# Patient Record
Sex: FEMALE | Race: WHITE | NOT HISPANIC OR LATINO | Employment: FULL TIME | URBAN - METROPOLITAN AREA
[De-identification: names, ages, dates, MRNs, and addresses within clinical notes are randomized per-mention and may not be internally consistent; named-entity substitution may affect disease eponyms.]

---

## 2017-05-08 ENCOUNTER — ALLSCRIPTS OFFICE VISIT (OUTPATIENT)
Dept: OTHER | Facility: OTHER | Age: 37
End: 2017-05-08

## 2017-05-09 ENCOUNTER — GENERIC CONVERSION - ENCOUNTER (OUTPATIENT)
Dept: OTHER | Facility: OTHER | Age: 37
End: 2017-05-09

## 2017-05-10 ENCOUNTER — TRANSCRIBE ORDERS (OUTPATIENT)
Dept: ADMINISTRATIVE | Facility: HOSPITAL | Age: 37
End: 2017-05-10

## 2017-05-10 DIAGNOSIS — N63.0 BENIGN BREAST LUMPS: Primary | ICD-10-CM

## 2017-05-11 ENCOUNTER — HOSPITAL ENCOUNTER (OUTPATIENT)
Dept: ULTRASOUND IMAGING | Facility: CLINIC | Age: 37
Discharge: HOME | End: 2017-05-11
Payer: COMMERCIAL

## 2017-05-11 DIAGNOSIS — N63.0 BENIGN BREAST LUMPS: ICD-10-CM

## 2017-05-11 DIAGNOSIS — N60.19 DIFFUSE CYSTIC MASTOPATHY OF BREAST: ICD-10-CM

## 2017-05-11 PROCEDURE — 76642 ULTRASOUND BREAST LIMITED: CPT

## 2017-05-11 RX ORDER — METHYLPREDNISOLONE 4 MG/1
TABLET ORAL
COMMUNITY
Start: 2015-09-15 | End: 2020-12-10

## 2017-05-11 RX ORDER — FLUTICASONE PROPIONATE 50 MCG
2 SPRAY, SUSPENSION (ML) NASAL
COMMUNITY
Start: 2015-09-15 | End: 2020-12-10

## 2017-05-11 RX ORDER — OMEPRAZOLE 40 MG/1
40 CAPSULE, DELAYED RELEASE ORAL
COMMUNITY
Start: 2016-05-17 | End: 2020-12-10

## 2017-05-11 RX ORDER — MAGNESIUM HYDROXIDE 1200 MG/15ML
LIQUID ORAL
COMMUNITY
Start: 2014-11-07 | End: 2020-12-10

## 2017-05-11 RX ORDER — AZITHROMYCIN 250 MG/1
250 TABLET, FILM COATED ORAL
COMMUNITY
Start: 2017-03-21 | End: 2020-12-10

## 2017-05-11 RX ORDER — ESCITALOPRAM OXALATE 10 MG/1
20 TABLET ORAL
COMMUNITY
Start: 2016-04-23 | End: 2020-12-10

## 2017-05-11 RX ORDER — METHYLPREDNISOLONE 4 MG/1
TABLET ORAL
COMMUNITY
Start: 2017-03-21 | End: 2020-12-10

## 2017-05-15 ENCOUNTER — HOSPITAL ENCOUNTER (OUTPATIENT)
Dept: ULTRASOUND IMAGING | Facility: CLINIC | Age: 37
Discharge: HOME | End: 2017-05-15
Payer: COMMERCIAL

## 2017-05-15 ENCOUNTER — HOSPITAL ENCOUNTER (OUTPATIENT)
Dept: ULTRASOUND IMAGING | Facility: CLINIC | Age: 37
Discharge: HOME | End: 2017-05-15
Admitting: RADIOLOGY
Payer: COMMERCIAL

## 2017-05-15 VITALS — SYSTOLIC BLOOD PRESSURE: 130 MMHG | DIASTOLIC BLOOD PRESSURE: 80 MMHG | HEART RATE: 72 BPM

## 2017-05-15 DIAGNOSIS — N60.19 DIFFUSE CYSTIC MASTOPATHY OF BREAST: ICD-10-CM

## 2017-05-15 DIAGNOSIS — R92.8 ABNORMAL FINDINGS ON DIAGNOSTIC IMAGING OF BREAST: ICD-10-CM

## 2017-05-15 DIAGNOSIS — N63.0 LUMP OR MASS IN BREAST: ICD-10-CM

## 2017-05-15 PROCEDURE — 88305 TISSUE EXAM BY PATHOLOGIST: CPT | Performed by: NURSE PRACTITIONER

## 2017-05-15 PROCEDURE — 19084 BX BREAST ADD LESION US IMAG: CPT

## 2017-05-15 PROCEDURE — 19083 BX BREAST 1ST LESION US IMAG: CPT

## 2017-05-15 RX ORDER — LIDOCAINE HYDROCHLORIDE 10 MG/ML
4 INJECTION, SOLUTION INFILTRATION; PERINEURAL ONCE
Status: COMPLETED | OUTPATIENT
Start: 2017-05-15 | End: 2017-05-15

## 2017-05-15 RX ORDER — SODIUM BICARBONATE 42 MG/ML
1 INJECTION, SOLUTION INTRAVENOUS ONCE
Status: COMPLETED | OUTPATIENT
Start: 2017-05-15 | End: 2017-05-15

## 2017-05-15 RX ADMIN — SODIUM BICARBONATE 0.5 MEQ: 42 SOLUTION INTRAVENOUS at 14:35

## 2017-05-15 RX ADMIN — SODIUM BICARBONATE 0.5 MEQ: 42 SOLUTION INTRAVENOUS at 14:44

## 2017-05-15 RX ADMIN — LIDOCAINE HYDROCHLORIDE 4 ML: 10 INJECTION, SOLUTION INFILTRATION; PERINEURAL at 14:44

## 2017-05-15 RX ADMIN — LIDOCAINE HYDROCHLORIDE 4 ML: 10 INJECTION, SOLUTION INFILTRATION; PERINEURAL at 14:35

## 2017-05-16 LAB
ADEQUACY: (HISTORICAL): NORMAL
CLINICIAN PROVIDIED ICD 9 OR 10 (HISTORICAL): NORMAL
COMMENT (HISTORICAL): NORMAL
DIAGNOSIS (HISTORICAL): NORMAL
HPV HIGH RISK (HISTORICAL): NEGATIVE
NOTE: (HISTORICAL): NORMAL
PERFORMED BY (HISTORICAL): NORMAL
TEST INFORMATION (HISTORICAL): NORMAL

## 2017-06-19 ENCOUNTER — ALLSCRIPTS OFFICE VISIT (OUTPATIENT)
Dept: OTHER | Facility: OTHER | Age: 37
End: 2017-06-19

## 2017-06-19 ENCOUNTER — TRANSCRIBE ORDERS (OUTPATIENT)
Dept: ADMINISTRATIVE | Facility: HOSPITAL | Age: 37
End: 2017-06-19

## 2017-06-19 DIAGNOSIS — D24.1 BENIGN NEOPLASM OF RIGHT BREAST: Primary | ICD-10-CM

## 2017-11-16 ENCOUNTER — HOSPITAL ENCOUNTER (OUTPATIENT)
Dept: MAMMOGRAPHY | Facility: CLINIC | Age: 37
Discharge: HOME | End: 2017-11-16
Payer: COMMERCIAL

## 2017-11-16 ENCOUNTER — HOSPITAL ENCOUNTER (OUTPATIENT)
Dept: ULTRASOUND IMAGING | Facility: CLINIC | Age: 37
Discharge: HOME | End: 2017-11-16
Payer: COMMERCIAL

## 2017-11-16 DIAGNOSIS — D24.1 BENIGN NEOPLASM OF RIGHT BREAST: ICD-10-CM

## 2017-11-16 PROCEDURE — G0204 DX MAMMO INCL CAD BI: HCPCS

## 2017-11-16 PROCEDURE — 76642 ULTRASOUND BREAST LIMITED: CPT

## 2017-11-16 PROCEDURE — G0279 TOMOSYNTHESIS, MAMMO: HCPCS

## 2017-11-27 ENCOUNTER — TRANSCRIBE ORDERS (OUTPATIENT)
Dept: ADMINISTRATIVE | Facility: HOSPITAL | Age: 37
End: 2017-11-27

## 2017-11-27 ENCOUNTER — ALLSCRIPTS OFFICE VISIT (OUTPATIENT)
Dept: OTHER | Facility: OTHER | Age: 37
End: 2017-11-27

## 2017-11-27 DIAGNOSIS — D24.1 BENIGN NEOPLASM OF RIGHT BREAST: Primary | ICD-10-CM

## 2017-11-28 NOTE — PROGRESS NOTES
Assessment    1  Fibroadenoma, right (217) (D24 1)   2  Fibrocystic breast (610 1) (N60 19)    Plan  Fibroadenoma, right    · Follow-up visit in 1 year Evaluation and Treatment  Follow-up  Status: Complete  Done:2017   Ordered;Fibroadenoma, right; Ordered By: Johnny Colindres Performed:  Due: 03REE5826; Last Updated By: Alexandria Smart; 2017 12:26:07 PM   · *US BREAST RIGHT LIMITED (DIAGNOSTIC); Status:Active; Requested MME:[id number];    Perform:[facility]; CZY:[id number]; Last Updated Lubertha Sep; 2017 12:26:07 PM;Ordered;right; Lisbeth Moreno; Discussion/Summary  30 yo female with two complex fibroadenoma of the right breast  Her exam and imaging are stable  We discussed continued surveillance given the stability  She is also a high risk patient for surgery given her airway issues  I will make arrangements for a right breast ultrasound and exam for one year  I advised her to return sooner should the need arise  Chief Complaint  Chief Complaint Free Text Note Form: Pt is here for her 6 month breast follow-up  new complaints at this time  [email or web address] loren 3d dx mammo/right us (B2)  Ashley Spain  History of Present Illness  Diagnosis and Staging: complex fibroadenoma right breast   Treatment History: 5/15/17 right core x 2      Review of Systems  Complete Female ROS SurgOnc:  Constitutional: weight fluctuates  Eyes: eyesight problems  ENT: difficulty swallowing, but-- no tinnitus-- and-- no new masses in head, oral cavity, or neck  Cardiovascular: No complaints of chest pain, no palpitations, no ankle edema  Respiratory: shortness of breath,-- cough,-- orthopnea-- and-- shortness of breath during exertion  Gastrointestinal: No complaints of jaundice, no bloody stools, no pale stools  Genitourinary: irregular menses  Musculoskeletal: No complaints of weakness, paralysis, joint stiffness or arthralgias,    Integumentary: No complaints of rash, no new lesions  Neurological: headache  Hematologic/Lymphatic: a tendency for easy bruising  Active Problems    1  Encounter for routine gynecological examination with Papanicolaou smear of cervix (V72 31,V76 2) (Z01 419)   2  Fibroadenoma, right (217) (D24 1)   3  Fibrocystic breast (610 1) (N60 19)   4  Skin rash (782 1) (R21)    Past Medical History  1  History of Anxiety (300 00) (F41 9)   2  History of Disorder of airway (519 9) (J98 9)   3  History of cerebral hemorrhage (V12 59) (Z86 79)   4  History of depression (V11 8) (Z86 59)   5  History of gastroesophageal reflux (GERD) (V12 79) (Z87 19)   6  Denied: History of pregnancy   7  History of sinus problem   8  History of sleep apnea (V13 89) (Z86 69)   9  History of Menarche (V21 8)    Surgical History  1  History of Biopsy Breast Percutaneous Needle Core   · 05/15/17 right X two sites   2  History of Tracheostomy   · From premature birth,  Multiple trachea surgeries 2002, 2007, 2013  Surgical History Reviewed: The surgical history was reviewed and updated today  Family History  Mother    1  Family history of hypertension (V17 49) (Z82 49)   2  Family history of type 2 diabetes mellitus (V18 0) (Z83 3)  Father    3  Family history of hypertension (V17 49) (Z82 49)   4  Family history of type 2 diabetes mellitus (V18 0) (Z83 3)  Maternal Grandmother    5  Family history of malignant neoplasm of thyroid (V16 8) (Z80 8)   6  Family history of malignant neoplasm of urinary bladder (V16 52) (Z80 52)  Paternal Grandfather    7  Family history of malignant neoplasm of prostate (V16 42) (Z80 42)   8  Family history of malignant neoplasm of thyroid (V16 8) (Z80 8)  Paternal Cousin    5  Family history of malignant neoplasm of brain (V16 8) (Z80 8)  Family History Reviewed: The family history was reviewed and updated today         Social History     · Alcohol use (V49 89) (Z78 9)   · Never a smoker   · Occasional caffeine consumption  Social History Reviewed: The social history was reviewed and updated today  The social history was reviewed and is unchanged  Current Meds   1  Claritin 10 MG Oral Capsule; Therapy: (Recorded:19Jun2017) to Recorded   2  Flonase 50 MCG/ACT SUSP; Therapy: (Recorded:19Jun2017) to Recorded   3  Lexapro 10 MG Oral Tablet; Therapy: (Recorded:27Nov2017) to Recorded   4  Nystatin 850785 UNIT/GM External Powder; APPLY 2 times daily as needed for yeast infection rash; Therapy: 81JLY2425 to (Last AW:42BTP3849)  Requested for: 41FDR9608 Ordered   5  Omeprazole 40 MG Oral Capsule Delayed Release; Therapy: (Recorded:19Jun2017) to Recorded   6  One Daily Womens Oral Tablet; Therapy: (Recorded:19Jun2017) to Recorded   7  Qvar 40 MCG/ACT Inhalation Aerosol Solution; Therapy: (Recorded:19Jun2017) to Recorded   8  Wellbutrin  MG Oral Tablet Extended Release 24 Hour; Therapy: 75GXE9729 to Recorded  Medication List Reviewed: The medication list was reviewed and updated today  Allergies  1  Cephalosporins   2  Clindamycin Phosphate SOLN   3  Penicillins   4  Vitamin E CAPS  5  Dairy    Vitals  Vital Signs    Recorded: 42UHU5577 11:57AM   Temperature 98 4 F   Heart Rate 127   Respiration 16   Systolic 467   Diastolic 82   Height 5 ft 1 5 in   Weight 169 lb    BMI Calculated 31 42   BSA Calculated 1 77   O2 Saturation 98       Physical Exam   Constitutional: General appearance: Abnormal   chronically ill  Neuro: Orientation to person, place and time: Normal  -- Mood and affect: Normal    Lymphatic: no evidence of cervical adenopathy bilaterally  -- no evidence of axillary adenopathy bilaterally  Skin: Examination of Breast: Abnormal   Breast: Examination of both breasts revealed fibrocystic changes  Examination of the right breast revealed no erythema,-- no swelling-- and-- no tenderness  Examination of the left breast revealed no erythema,-- no swelling-- and-- no tenderness   Nipples appeared normal No discharge was noted from the nipples  No breast masses were palpable  Axillary nodes: no enlarged nodes  Results/Data  Diagnostic Studies Reviewed Surg Onc:  X-ray Review 11/16/17 bilateral 3d diagnostic mammogram and right breast ultrasound with stable findings  Future Appointments    Date/Time Provider Specialty Site   11/27/2018 11:30 AM BRINA Garrison  Surgical Oncology [facility] [place]     End of Encounter Meds    1  Wellbutrin  MG Oral Tablet Extended Release 24 Hour (BuPROPion HCl ER (XL)); Therapy: 22XJK5528 to Recorded    2  Lexapro 10 MG Oral Tablet (Escitalopram Oxalate); Therapy: (Recorded:27Nov2017) to Recorded    3  One Daily Womens Oral Tablet; Therapy: (Recorded:19Jun2017) to Recorded    4  Qvar 40 MCG/ACT Inhalation Aerosol Solution; Therapy: (Recorded:19Jun2017) to Recorded    5  Omeprazole 40 MG Oral Capsule Delayed Release; Therapy: (Recorded:19Jun2017) to Recorded    6  Claritin 10 MG Oral Capsule; Therapy: (Recorded:19Jun2017) to Recorded   7  Flonase 50 MCG/ACT SUSP (Fluticasone Propionate); Therapy: (Recorded:19Jun2017) to Recorded    8  Nystatin 027665 UNIT/GM External Powder; APPLY 2 times daily as needed for yeast infection rash;  Therapy: 97NCJ2129 to (Last XH:85XKX5419)  Requested for: 31AXP1586 Ordered    Signatures   Electronically signed by : BRINA Lopez ; Nov 27 2017  1:19PM EST                       (Author)

## 2018-01-12 VITALS
HEIGHT: 62 IN | DIASTOLIC BLOOD PRESSURE: 82 MMHG | RESPIRATION RATE: 16 BRPM | HEART RATE: 127 BPM | BODY MASS INDEX: 31.1 KG/M2 | WEIGHT: 169 LBS | SYSTOLIC BLOOD PRESSURE: 124 MMHG | TEMPERATURE: 98.4 F | OXYGEN SATURATION: 98 %

## 2018-01-13 VITALS
DIASTOLIC BLOOD PRESSURE: 78 MMHG | SYSTOLIC BLOOD PRESSURE: 140 MMHG | BODY MASS INDEX: 29.1 KG/M2 | HEIGHT: 62 IN | WEIGHT: 158.13 LBS

## 2018-01-14 VITALS
TEMPERATURE: 100.3 F | WEIGHT: 161.13 LBS | RESPIRATION RATE: 17 BRPM | SYSTOLIC BLOOD PRESSURE: 158 MMHG | BODY MASS INDEX: 29.65 KG/M2 | OXYGEN SATURATION: 97 % | HEART RATE: 149 BPM | HEIGHT: 62 IN | DIASTOLIC BLOOD PRESSURE: 94 MMHG

## 2018-11-19 ENCOUNTER — HOSPITAL ENCOUNTER (OUTPATIENT)
Dept: ULTRASOUND IMAGING | Facility: CLINIC | Age: 38
Discharge: HOME | End: 2018-11-19
Payer: COMMERCIAL

## 2018-11-19 VITALS — WEIGHT: 186 LBS | BODY MASS INDEX: 34.23 KG/M2 | HEIGHT: 62 IN

## 2018-11-19 DIAGNOSIS — D24.1 BENIGN NEOPLASM OF RIGHT BREAST: ICD-10-CM

## 2018-11-19 PROCEDURE — 76642 ULTRASOUND BREAST LIMITED: CPT

## 2018-12-03 ENCOUNTER — OFFICE VISIT (OUTPATIENT)
Dept: SURGICAL ONCOLOGY | Facility: CLINIC | Age: 38
End: 2018-12-03
Payer: COMMERCIAL

## 2018-12-03 VITALS
TEMPERATURE: 97.7 F | SYSTOLIC BLOOD PRESSURE: 140 MMHG | HEIGHT: 61 IN | BODY MASS INDEX: 34.78 KG/M2 | HEART RATE: 75 BPM | DIASTOLIC BLOOD PRESSURE: 80 MMHG | WEIGHT: 184.2 LBS | RESPIRATION RATE: 14 BRPM

## 2018-12-03 DIAGNOSIS — D24.1 FIBROADENOMA OF RIGHT BREAST: Primary | ICD-10-CM

## 2018-12-03 PROCEDURE — 99213 OFFICE O/P EST LOW 20 MIN: CPT | Performed by: SURGERY

## 2018-12-03 RX ORDER — LORATADINE 10 MG/1
CAPSULE, LIQUID FILLED ORAL
COMMUNITY
End: 2020-12-10

## 2018-12-03 RX ORDER — NYSTATIN 100000 [USP'U]/G
POWDER TOPICAL
COMMUNITY
Start: 2017-05-08 | End: 2020-12-10

## 2018-12-03 RX ORDER — BUPROPION HYDROCHLORIDE 150 MG/1
150 TABLET, EXTENDED RELEASE ORAL
COMMUNITY
End: 2020-12-10

## 2018-12-03 NOTE — PROGRESS NOTES
Surgical Oncology Follow Up       [facility] SURGICAL ONCOLOGY [place]  [address] [facility] [address]    Rupal Eckert  1980  [id number]  [facility] SURGICAL ONCOLOGY [place]  [address] [facility] [address]    Chief Complaint   Patient presents with    Follow-up     1 year        Assessment/Plan   Diagnoses and all orders for this visit:    Fibroadenoma of right breast  -     US breast right limited (diagnostic); Future    Other orders  -     buPROPion (WELLBUTRIN SR) 150 mg 12 hr tablet; Take 150 mg by mouth  -     Loratadine (CLARITIN) 10 MG CAPS; Take by mouth  -     nystatin (MYCOSTATIN) powder; Apply topically        Advance Care Planning/Advance Directives:  Did not discuss  with the patient  Oncology History:     No history exists  History of Present Illness: follow up   -Interval History: none    Review of Systems:  Review of Systems   Constitutional: Negative  Negative for appetite change and fever  Eyes: Negative  Respiratory: Positive for shortness of breath, wheezing and stridor  Cardiovascular: Negative  Gastrointestinal: Negative  Endocrine: Negative  Genitourinary: Negative  Musculoskeletal: Negative  Negative for arthralgias and myalgias  Skin: Negative  Allergic/Immunologic: Negative  Neurological: Negative  Hematological: Negative  Negative for adenopathy  Does not bruise/bleed easily  Psychiatric/Behavioral: Negative          Patient Active Problem List   Diagnosis    Fibroadenoma of right breast     Past Medical History:   Diagnosis Date    Anxiety     Cerebral hemorrhage ([address] )     Depression     Disorder of airway     Fibroadenoma of right breast     GERD (gastroesophageal reflux disease)     Sinus problem     Skin rash     Sleep apnea      Past Surgical History:   Procedure Laterality Date    BREAST BIOPSY Right     2 sites    [address] GUIDANCE BREAST BIOPSY RIGHT EACH ADDITIONAL Right 5/15/2017    US GUIDED BREAST BIOPSY RIGHT COMPLETE Right 5/15/2017     Family History   Problem Relation Age of Onset    Thyroid cancer Maternal Grandmother         age dx unk    Cancer Maternal Grandmother         bladder age dx unk    Prostate cancer Paternal Grandfather         age dx unk    Thyroid cancer Paternal Grandfather         age dx unk    Brain cancer Cousin         age dx unk     Social History     Social History    Marital status: Single     Spouse name: N/A    Number of children: N/A    Years of education: N/A     Occupational History    Not on file       Social History Main Topics    Smoking status: Never Smoker    Smokeless tobacco: Never Used    Alcohol use Yes    Drug use: Unknown    Sexual activity: Not on file     Other Topics Concern    Not on file     Social History Narrative    No narrative on file       Current Outpatient Prescriptions:     azithromycin (ZITHROMAX) 250 mg tablet, Take 250 mg by mouth, Disp: , Rfl:     beclomethasone (QVAR) 40 MCG/ACT inhaler, 2 puffs daily, Disp: , Rfl:     escitalopram (LEXAPRO) 10 mg tablet, Take 20 mg by mouth, Disp: , Rfl:     fluticasone (FLONASE) 50 mcg/act nasal spray, 2 sprays, Disp: , Rfl:     methylprednisolone (MEDROL) 4 mg tablet, Take 6 tabs day 1,5 tabs day 2,4 tabs day 3,3 tabs day 4,2 tabs day 5,1 tab day 6, Disp: , Rfl:     Methylprednisolone 4 MG TBPK, Take as directed, Disp: , Rfl:     MULTIPLE VITAMINS PO, Take by mouth, Disp: , Rfl:     nystatin (MYCOSTATIN) powder, Apply topically, Disp: , Rfl:     omeprazole (PriLOSEC) 40 MG capsule, Take 40 mg by mouth, Disp: , Rfl:     sodium chloride 0 9 % irrigation, Irrigate with as directed, Disp: , Rfl:     buPROPion (WELLBUTRIN SR) 150 mg 12 hr tablet, Take 150 mg by mouth, Disp: , Rfl:     Loratadine (CLARITIN) 10 MG CAPS, Take by mouth, Disp: , Rfl:   Allergies   Allergen Reactions    Cephalosporins     Clindamycin     Dairy Aid [Lactase]     Penicillins     Vitamin E Hives       The following portions of the patient's history were reviewed and updated as appropriate: allergies, current medications, past family history, past medical history, past social history, past surgical history and problem list         Vitals:    12/03/18 1431   BP: 140/80   Pulse: 75   Resp: 14   Temp: 97 7 °F (36 5 °C)       Physical Exam   Constitutional: She is oriented to person, place, and time  She appears distressed (respiratory-baseline)  Pulmonary/Chest: Right breast exhibits mass (2 adjacent well circumscribed) and skin change (fungal rash)  Right breast exhibits no inverted nipple, no nipple discharge and no tenderness  Left breast exhibits skin change (fungal rash)  Left breast exhibits no inverted nipple, no mass, no nipple discharge and no tenderness  Lymphadenopathy:        Right axillary: No pectoral and no lateral adenopathy present  Left axillary: No pectoral and no lateral adenopathy present  Right: No supraclavicular adenopathy present  Left: No supraclavicular adenopathy present  Neurological: She is alert and oriented to person, place, and time  Psychiatric: She has a normal mood and affect  Results:      Imaging  11/19/2018 right breast ultrasound is benign for two stable masses in the right breast nine o'clock axis measuring nine in 7 mm, recommendation was for a screening mammogram in one year    I reviewed the above  imaging data  Discussion/Summary:  74-year-old female who has two known breast masses that are biopsy proven fibroadenomata  These are stable on examination today  Her recent ultrasound is also stable  There was a notation made about doing a mammogram in one year  She is 30 and has no family history of breast cancer  I will therefore order another ultrasound of the right breast in one year and see her again next year    At that point if there are no concerns, the lesions would have remained stable for over two years  I will likely have her resume routine care at that time

## 2019-11-19 ENCOUNTER — HOSPITAL ENCOUNTER (OUTPATIENT)
Dept: ULTRASOUND IMAGING | Facility: CLINIC | Age: 39
Discharge: HOME | End: 2019-11-19
Payer: COMMERCIAL

## 2019-11-19 ENCOUNTER — HOSPITAL ENCOUNTER (OUTPATIENT)
Dept: MAMMOGRAPHY | Facility: CLINIC | Age: 39
Discharge: HOME | End: 2019-11-19
Payer: COMMERCIAL

## 2019-11-19 VITALS — WEIGHT: 184 LBS | HEIGHT: 61 IN | BODY MASS INDEX: 34.74 KG/M2

## 2019-11-19 DIAGNOSIS — Z12.31 OTHER SCREENING MAMMOGRAM: ICD-10-CM

## 2019-11-19 DIAGNOSIS — D24.1 FIBROADENOMA OF RIGHT BREAST: ICD-10-CM

## 2019-11-19 PROCEDURE — 76642 ULTRASOUND BREAST LIMITED: CPT

## 2019-11-19 PROCEDURE — 77067 SCR MAMMO BI INCL CAD: CPT

## 2019-11-19 PROCEDURE — 77063 BREAST TOMOSYNTHESIS BI: CPT

## 2019-12-03 ENCOUNTER — OFFICE VISIT (OUTPATIENT)
Dept: SURGICAL ONCOLOGY | Facility: CLINIC | Age: 39
End: 2019-12-03
Payer: COMMERCIAL

## 2019-12-03 VITALS
BODY MASS INDEX: 33.19 KG/M2 | TEMPERATURE: 97.6 F | DIASTOLIC BLOOD PRESSURE: 80 MMHG | HEIGHT: 61 IN | SYSTOLIC BLOOD PRESSURE: 110 MMHG | RESPIRATION RATE: 16 BRPM | WEIGHT: 175.8 LBS | HEART RATE: 82 BPM

## 2019-12-03 DIAGNOSIS — N60.02 BREAST CYST, LEFT: ICD-10-CM

## 2019-12-03 DIAGNOSIS — D24.1 FIBROADENOMA OF RIGHT BREAST: Primary | ICD-10-CM

## 2019-12-03 PROCEDURE — 99213 OFFICE O/P EST LOW 20 MIN: CPT | Performed by: SURGERY

## 2019-12-03 NOTE — PROGRESS NOTES
Surgical Oncology Follow Up       [address] SUR[facility] [address]    Steve Hull  1980  [id number]  [address]  [facility] SUR[facility]    Chief Complaint   Patient presents with    Follow-up       Assessment/Plan   Diagnoses and all orders for this visit:    Fibroadenoma of right breast    Breast cyst, left        Advance Care Planning/Advance Directives:  Did not discuss  with the patient  Oncology History:     No history exists  History of Present Illness: follow up visit secondary to fibroadenoma  -Interval History:recent breast imaging    Review of Systems:  Review of Systems   Constitutional: Negative  Negative for appetite change and fever  Eyes: Negative  Respiratory: Positive for shortness of breath  Cardiovascular: Negative  Gastrointestinal: Negative  Endocrine: Negative  Genitourinary: Negative  Musculoskeletal: Negative  Negative for arthralgias and myalgias  Skin: Negative  Allergic/Immunologic: Negative  Neurological: Negative  Hematological: Negative  Negative for adenopathy  Does not bruise/bleed easily  Psychiatric/Behavioral: Negative          Patient Active Problem List   Diagnosis    Fibroadenoma of right breast    Breast cyst, left     Past Medical History:   Diagnosis Date    Anxiety     Cerebral hemorrhage (HCC)     Depression     Disorder of airway     Fibroadenoma of right breast     GERD (gastroesophageal reflux disease)     Sinus problem     Skin rash     Sleep apnea      Past Surgical History:   Procedure Laterality Date    BREAST BIOPSY Right 05/15/2017    2 sites-U/S BX    TRACHEOSTOMY      US GUIDANCE BREAST BIOPSY RIGHT EACH ADDITIONAL Right 5/15/2017    US GUIDED BREAST BIOPSY RIGHT COMPLETE Right 5/15/2017     Family History   Problem Relation Age of Onset    Thyroid cancer Maternal Grandmother         age dx unk    Cancer Maternal Grandmother         bladder age dx unk    Prostate cancer Paternal Grandfather         age dx unk    Thyroid cancer Paternal Grandfather         age dx unk    Brain cancer Cousin         age dx unk    No Known Problems Mother     No Known Problems Father     No Known Problems Sister     No Known Problems Maternal Grandfather     No Known Problems Paternal Grandmother     No Known Problems Sister     No Known Problems Brother     No Known Problems Brother     No Known Problems Paternal Aunt     No Known Problems Other     Breast cancer Cousin      Social History     Socioeconomic History    Marital status: Single     Spouse name: Not on file    Number of children: Not on file    Years of education: Not on file    Highest education level: Not on file   Occupational History    Not on file   Social Needs    Financial resource strain: Not on file    Food insecurity:     Worry: Not on file     Inability: Not on file    Transportation needs:     Medical: Not on file     Non-medical: Not on file   Tobacco Use    Smoking status: Never Smoker    Smokeless tobacco: Never Used   Substance and Sexual Activity    Alcohol use:  Yes    Drug use: Not on file    Sexual activity: Not on file   Lifestyle    Physical activity:     Days per week: Not on file     Minutes per session: Not on file    Stress: Not on file   Relationships    Social connections:     Talks on phone: Not on file     Gets together: Not on file     Attends [employer] service: Not on file     Active member of club or organization: Not on file     Attends meetings of clubs or organizations: Not on file     Relationship status: Not on file    Intimate partner violence:     Fear of current or ex partner: Not on file     Emotionally abused: Not on file     Physically abused: Not on file     Forced sexual activity: Not on file   Other Topics Concern    Not on file   Social History Narrative    Not on file       Current Outpatient Medications:     azithromycin (ZITHROMAX) 250 mg tablet, Take 250 mg by mouth, Disp: , Rfl:     beclomethasone (QVAR) 40 MCG/ACT inhaler, 2 puffs daily, Disp: , Rfl:     fluticasone (FLONASE) 50 mcg/act nasal spray, 2 sprays, Disp: , Rfl:     Loratadine (CLARITIN) 10 MG CAPS, Take by mouth, Disp: , Rfl:     methylprednisolone (MEDROL) 4 mg tablet, Take 6 tabs day 1,5 tabs day 2,4 tabs day 3,3 tabs day 4,2 tabs day 5,1 tab day 6, Disp: , Rfl:     Methylprednisolone 4 MG TBPK, Take as directed, Disp: , Rfl:     MULTIPLE VITAMINS PO, Take by mouth, Disp: , Rfl:     nystatin (MYCOSTATIN) powder, Apply topically, Disp: , Rfl:     omeprazole (PriLOSEC) 40 MG capsule, Take 40 mg by mouth, Disp: , Rfl:     buPROPion (WELLBUTRIN SR) 150 mg 12 hr tablet, Take 150 mg by mouth, Disp: , Rfl:     escitalopram (LEXAPRO) 10 mg tablet, Take 20 mg by mouth, Disp: , Rfl:     sodium chloride 0 9 % irrigation, Irrigate with as directed, Disp: , Rfl:   Allergies   Allergen Reactions    Cephalosporins Hives and Fever    Clindamycin Hives and Fever    Dairy Aid [Lactase] Sneezing    Penicillins Hives and Fever    Vitamin E Hives       The following portions of the patient's history were reviewed and updated as appropriate: allergies, current medications, past family history, past medical history, past social history, past surgical history and problem list         Vitals:    12/03/19 1053   BP: 110/80   Pulse: 82   Resp: 16   Temp: 97 6 °F (36 4 °C)       Physical Exam   Constitutional: She is oriented to person, place, and time  She appears well-developed and well-nourished  HENT:   Head: Normocephalic and atraumatic  Pulmonary/Chest: Right breast exhibits no inverted nipple, no mass, no nipple discharge, no skin change and no tenderness  Left breast exhibits no inverted nipple, no mass, no nipple discharge, no skin change and no tenderness     Lymphadenopathy: Right axillary: No pectoral and no lateral adenopathy present  Left axillary: No pectoral and no lateral adenopathy present  Right: No supraclavicular adenopathy present  Left: No supraclavicular adenopathy present  Neurological: She is alert and oriented to person, place, and time  Psychiatric: She has a normal mood and affect  Results:  Labs:      Imaging  11/19/2019 bilateral 3D screening mammogram and bilateral breast ultrasound her mammogram shows dense breast tissue, there is an oval mass in the lower inner left breast, biopsy clips on the right breast, bilateral ultrasound was also performed showing a simple cyst on the left measuring 6 mm and two hypoechoic masses on the right that both decreased in size and are known biopsy-proven fibroadenoma    I reviewed the above imaging data  Discussion/Summary:  42-year-old female here today for a follow-up visit secondary to right breast fibroadenoma  Her imaging has remained stable for roughly 2-1/2 years  There are no concerns on examination today  She did have an incidental cyst noted on recent mammogram   Of note I did not order a mammogram only the follow-up ultrasound  She will be re-establishing care with a gynecologist   She has no family history of breast cancer  I will see her on an as-needed basis  I again advised her to start annual mammography at the age of 38

## 2020-12-10 ENCOUNTER — OFFICE VISIT (OUTPATIENT)
Dept: URGENT CARE | Facility: CLINIC | Age: 40
End: 2020-12-10
Payer: COMMERCIAL

## 2020-12-10 VITALS
OXYGEN SATURATION: 96 % | TEMPERATURE: 97.5 F | HEIGHT: 61 IN | RESPIRATION RATE: 18 BRPM | WEIGHT: 155 LBS | HEART RATE: 80 BPM | BODY MASS INDEX: 29.27 KG/M2

## 2020-12-10 DIAGNOSIS — J06.9 VIRAL URI WITH COUGH: Primary | ICD-10-CM

## 2020-12-10 DIAGNOSIS — Z20.822 CLOSE EXPOSURE TO 2019 NOVEL CORONAVIRUS: ICD-10-CM

## 2020-12-10 PROCEDURE — U0003 INFECTIOUS AGENT DETECTION BY NUCLEIC ACID (DNA OR RNA); SEVERE ACUTE RESPIRATORY SYNDROME CORONAVIRUS 2 (SARS-COV-2) (CORONAVIRUS DISEASE [COVID-19]), AMPLIFIED PROBE TECHNIQUE, MAKING USE OF HIGH THROUGHPUT TECHNOLOGIES AS DESCRIBED BY CMS-2020-01-R: HCPCS | Performed by: PHYSICIAN ASSISTANT

## 2020-12-10 PROCEDURE — G0382 LEV 3 HOSP TYPE B ED VISIT: HCPCS | Performed by: PHYSICIAN ASSISTANT

## 2020-12-11 LAB — SARS-COV-2 RNA SPEC QL NAA+PROBE: DETECTED

## 2022-12-16 ENCOUNTER — OFFICE VISIT (OUTPATIENT)
Dept: OBGYN CLINIC | Facility: CLINIC | Age: 42
End: 2022-12-16

## 2022-12-16 VITALS
DIASTOLIC BLOOD PRESSURE: 98 MMHG | SYSTOLIC BLOOD PRESSURE: 140 MMHG | HEIGHT: 61 IN | WEIGHT: 176 LBS | BODY MASS INDEX: 33.23 KG/M2

## 2022-12-16 DIAGNOSIS — M62.89 PELVIC FLOOR DYSFUNCTION: ICD-10-CM

## 2022-12-16 DIAGNOSIS — Z12.31 ENCOUNTER FOR SCREENING MAMMOGRAM FOR MALIGNANT NEOPLASM OF BREAST: ICD-10-CM

## 2022-12-16 DIAGNOSIS — Z01.419 ENCOUNTER FOR WELL WOMAN EXAM WITH ROUTINE GYNECOLOGICAL EXAM: Primary | ICD-10-CM

## 2022-12-16 PROBLEM — F41.8 DEPRESSION WITH ANXIETY: Status: ACTIVE | Noted: 2017-11-27

## 2022-12-16 PROBLEM — J38.02 VOCAL CORD PARALYSIS, BILATERAL COMPLETE: Status: ACTIVE | Noted: 2022-10-07

## 2022-12-16 PROBLEM — E55.9 VITAMIN D DEFICIENCY: Status: ACTIVE | Noted: 2022-10-07

## 2022-12-16 PROBLEM — F41.9 ANXIETY: Status: ACTIVE | Noted: 2022-10-07

## 2022-12-16 PROBLEM — N60.19 FIBROCYSTIC BREAST: Status: ACTIVE | Noted: 2017-05-08

## 2022-12-16 PROBLEM — N81.89 WEAKNESS OF PELVIC FLOOR: Status: ACTIVE | Noted: 2019-06-13

## 2022-12-16 PROBLEM — D24.1 FIBROADENOMA OF RIGHT BREAST: Status: ACTIVE | Noted: 2017-06-19

## 2022-12-16 PROBLEM — K21.9 GASTROESOPHAGEAL REFLUX DISEASE WITHOUT ESOPHAGITIS: Status: ACTIVE | Noted: 2022-10-07

## 2022-12-16 RX ORDER — BECLOMETHASONE DIPROPIONATE HFA 80 UG/1
AEROSOL, METERED RESPIRATORY (INHALATION)
COMMUNITY
Start: 2022-07-13

## 2022-12-16 RX ORDER — AZITHROMYCIN 250 MG/1
TABLET, FILM COATED ORAL
COMMUNITY
Start: 2022-10-07

## 2022-12-16 RX ORDER — LORATADINE 10 MG/1
TABLET ORAL
COMMUNITY
Start: 2010-09-13

## 2022-12-16 RX ORDER — ESCITALOPRAM OXALATE 20 MG/1
TABLET ORAL
COMMUNITY
Start: 2022-10-07

## 2022-12-16 RX ORDER — OMEPRAZOLE 40 MG/1
CAPSULE, DELAYED RELEASE ORAL
COMMUNITY
Start: 2007-09-13

## 2022-12-16 RX ORDER — METHYLPREDNISOLONE 16 MG/1
TABLET ORAL
COMMUNITY
Start: 2007-09-13

## 2022-12-16 NOTE — PROGRESS NOTES
[facility] [address], [address], [place], [place]    ASSESSMENT/PLAN: Oh Ordaz is a 46 y o  Freida Becerril who presents for annual gynecologic exam     Encounter for routine gynecologic examination  - Routine well woman exam completed today  - Cervical Cancer Screening: Current ASCCP Guidelines reviewed  Last Pap: 05/08/2017   Next Pap Due: today  - Contraceptive counseling discussed  Current contraception: none     Additional problems addressed during this visit:  1  Encounter for well woman exam with routine gynecological exam  -     Liquid-based pap, screening    2  Pelvic floor dysfunction  -     Ambulatory Referral to Physical Therapy; Future    3  Encounter for screening mammogram for malignant neoplasm of breast  -     Mammo screening bilateral w 3d & cad; Future      CC:  Annual Gynecologic Examination    HPI: Oh Ordaz is a 46 y o  Freida Becerril who presents for annual gynecologic examination  HPI  Patient presents for routine annual exam   Her last annual exam was in 2017  The patient would like to discuss possibility of conceiving in the future  She has never been sexually active  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  She has residual laryngeal and respiratory issues from multiple surgeries throughout the years  Otherwise she is healthy  She states that menarche was age 15 and she gets regular cycles  She presents today with breast fibroadenoma  She reports cystic breasts bilaterally and has followed with Dr Simon Allen in the past   Patient also reports we cannot explore  She reports leaking of urine when she coughs or sneezes  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  She does walk for exercise    We discussed pump for physical therapy    The following portions of the patient's history were reviewed and updated as appropriate: allergies, current medications, past family history, past medical history, obstetric history, gynecologic history, past social history, past surgical history and problem list     Review of Systems   Constitutional: Negative  HENT: Negative  Eyes: Negative  Respiratory: Negative  Cardiovascular: Negative  Gastrointestinal: Negative  Genitourinary: Negative  Musculoskeletal: Negative  All other systems reviewed and are negative  Objective:  /98 (BP Location: Left arm)   Ht 5' 1" (1 549 m)   Wt 79 8 kg (176 lb)   LMP 12/01/2022   BMI 33 25 kg/m²    Physical Exam  Vitals reviewed  Constitutional:       General: She is not in acute distress  Appearance: She is well-developed  HENT:      Head: Normocephalic and atraumatic  Nose: Nose normal    Cardiovascular:      Rate and Rhythm: Normal rate  Pulmonary:      Effort: Pulmonary effort is normal  No respiratory distress  Chest:   Breasts:     Breasts are symmetrical       Right: Normal  No mass, nipple discharge, skin change or tenderness  Left: Normal  No mass, nipple discharge, skin change or tenderness  Abdominal:      General: There is no distension  Palpations: Abdomen is soft  There is no mass  Tenderness: There is no abdominal tenderness  There is no guarding or rebound  Genitourinary:     General: Normal vulva  Exam position: Lithotomy position  Labia:         Right: No lesion  Left: No lesion  Urethra: No prolapse (urethral meatus normal)  Vagina: Normal  No vaginal discharge, erythema or bleeding  Cervix: Normal       Uterus: Normal        Adnexa: Right adnexa normal and left adnexa normal    Musculoskeletal:         General: Normal range of motion  Cervical back: Normal range of motion  Lymphadenopathy:      Upper Body:      Right upper body: No supraclavicular, axillary or pectoral adenopathy  Left upper body: No supraclavicular, axillary or pectoral adenopathy  Lower Body: No right inguinal adenopathy  No left inguinal adenopathy  Skin:     General: Skin is warm and dry  Neurological:      Mental Status: She is alert and oriented to person, place, and time  Psychiatric:         Behavior: Behavior normal          Thought Content:  Thought content normal          Judgment: Judgment normal

## 2022-12-19 LAB
HPV HR 12 DNA CVX QL NAA+PROBE: NEGATIVE
HPV16 DNA CVX QL NAA+PROBE: NEGATIVE
HPV18 DNA CVX QL NAA+PROBE: NEGATIVE

## 2022-12-22 ENCOUNTER — HOSPITAL ENCOUNTER (OUTPATIENT)
Dept: RADIOLOGY | Facility: CLINIC | Age: 42
End: 2022-12-22

## 2022-12-22 VITALS — BODY MASS INDEX: 33.42 KG/M2 | HEIGHT: 61 IN | WEIGHT: 177 LBS

## 2022-12-22 DIAGNOSIS — Z12.31 ENCOUNTER FOR SCREENING MAMMOGRAM FOR MALIGNANT NEOPLASM OF BREAST: ICD-10-CM

## 2022-12-23 LAB
LAB AP GYN PRIMARY INTERPRETATION: NORMAL
Lab: NORMAL
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
## 2023-05-19 ENCOUNTER — EVALUATION (OUTPATIENT)
Age: 43
End: 2023-05-19

## 2023-05-19 DIAGNOSIS — N94.10 DYSPAREUNIA IN FEMALE: ICD-10-CM

## 2023-05-19 DIAGNOSIS — M62.89 PELVIC FLOOR DYSFUNCTION: Primary | ICD-10-CM

## 2023-05-19 DIAGNOSIS — N39.3 SUI (STRESS URINARY INCONTINENCE, FEMALE): ICD-10-CM

## 2023-05-19 DIAGNOSIS — N94.2 VAGINISMUS: ICD-10-CM

## 2023-05-19 DIAGNOSIS — N39.41 URGE INCONTINENCE OF URINE: ICD-10-CM

## 2023-05-19 RX ORDER — BUPROPION HYDROCHLORIDE 150 MG/1
150 TABLET ORAL DAILY
COMMUNITY

## 2023-05-25 NOTE — PROGRESS NOTES
Daily Note     Today's date: 2023  Patient name: Adolfo Delgado  : 1980  MRN: [id number]  Referring provider: Quita Orozco MD  Dx:   Encounter Diagnosis     ICD-10-CM    1  Pelvic floor dysfunction  M62 89       2  COLLINS (stress urinary incontinence, female)  N39 3       3  Urge incontinence of urine  N39 41       4  Dyspareunia in female  N94 10       5  Vaginismus  N94 2                      Subjective: ***      Objective: See treatment diary below      Assessment: Adolfo Delgado tolerated today's treatment session well  Patient education provided on [facility] education options:61794}  Narcisa Corley completed all TE with good form and no adverse reactions  ***  Narcisa Corley continues to benefit from skilled OPPT services to address {khdailysymptoms:10658}  Will continue to address functional deficits and focus on progression of POC per patient tolerance  Patient performed {KUAerobic:02190} to increase blood flow to the area being treated, prepare the muscles for strength training and stretching, improve overall tolerance to activity, and aerobic endurance  Plan: Continue per plan of care  Progress treatment as tolerated         Precautions: standard, fall        2023         Visit Number: #1 #2 #3 #4 #5 #6 #7 #8   Patient Ed           PFM anatomy and function KH          Double void [employer] (handout)          Bladder diary  GA[facility] (handouts)                                                      Neuro Re-Ed                                                                                        Ther Ex           Warm-up                                                                                         Ther Activity                                 Manual           PFM Exam   **                                                     Modalities

## 2023-05-26 ENCOUNTER — OFFICE VISIT (OUTPATIENT)
Age: 43
End: 2023-05-26

## 2023-05-26 DIAGNOSIS — N39.41 URGE INCONTINENCE OF URINE: ICD-10-CM

## 2023-05-26 DIAGNOSIS — N94.10 DYSPAREUNIA IN FEMALE: ICD-10-CM

## 2023-05-26 DIAGNOSIS — N94.2 VAGINISMUS: ICD-10-CM

## 2023-05-26 DIAGNOSIS — M62.89 PELVIC FLOOR DYSFUNCTION: Primary | ICD-10-CM

## 2023-05-26 DIAGNOSIS — N39.3 SUI (STRESS URINARY INCONTINENCE, FEMALE): ICD-10-CM

## 2023-05-26 NOTE — PROGRESS NOTES
Daily Note     Today's date: 2023  Patient name: Nicki Mcguire  : 1980  MRN: [id number]  Referring provider: Lisseth Broussard MD  Dx:   Encounter Diagnosis     ICD-10-CM    1  Pelvic floor dysfunction  M62 89       2  COLLINS (stress urinary incontinence, female)  N39 3       3  Urge incontinence of urine  N39 41       4  Dyspareunia in female  N94 10       5  Vaginismus  N94 2           Start Time: 661  Stop Time:   Total time in clinic (min): 55 minutes    Subjective: Pt reports she has had less stress this week  She has been trying to increase water intake  She reports doing a little better with urinary symptoms  She is making if to the bathroom without leakage  When she coughs she is still leaking but is coughing more because she is sick  Her period is over now  She reports bowels are doing well- has not had any FI even with being around her period  They have been more on the loose side but no accidents  Objective: See treatment diary below      Assessment: Nicki Mcguire tolerated today's treatment session well  Patient education provided on pelvic floor muscle relaxation  and bladder function, vaginal trainers vs pelvic wands   Tran Byrd completed all TE with good form and no adverse reactions  Tran Byrd continues to benefit from skilled OPPT services to address urinary incontinence  and vaginisumus urinary urgency  Will continue to address functional deficits and focus on progression of POC per patient tolerance  Plan: Continue per plan of care  Progress treatment as tolerated         Precautions: standard, fall        2023        Visit Number: #1 #2 #3 #4 #5 #6 #7 #8   Patient Ed           PFM anatomy and function  [facility]          Double void [address] (handout)          Bladder diary  KH (handouts) Will set timer for 3-4 hours to void  Will increase water intake  Is more aware of timing and is having less urge UI        Urge deferral     [facility]        Bladder function   [address]         Ordering vaginal trainers vs pelvic wand   [address] ordering and benefits and use                    Neuro Re-Ed           Guided meditation   [address]  KH        DB with PFM contraction            Butterfly stretch   2 min         janette pose    2 min         Happy baby    2  Min         Cat cow    1 min                    Ther Ex           Warm-up                                                                                         Ther Activity                                 Manual           PFM Exam   25 min                                                      Modalities

## 2023-05-26 NOTE — PROGRESS NOTES
Daily Note     Today's date: 2023  Patient name: Colleen Quigley  : 1980  MRN: [id number]  Referring provider: Yeni Ding MD  Dx:   Encounter Diagnosis     ICD-10-CM    1  Pelvic floor dysfunction  M62 89       2  COLLINS (stress urinary incontinence, female)  N39 3       3  Urge incontinence of urine  N39 41       4  Dyspareunia in female  N94 10       5  Vaginismus  N94 2           Start Time: 9916  Stop Time: 1125  Total time in clinic (min): 50 minutes    Subjective: Pt reports she is newly placed on BP med (has not picked them up yet) and has a cold right and is taking antibiotic  She will be starting that today  She also has her period right now and states pain is not as bed right now  She completed bladder diaries  Objective: See treatment diary below    Pelvic Floor Muscle Control  Pelvic Floor Muscle Contraction: unable to Isolate; general substitution  Pelvic Floor Muscle Relaxation: incomplete (25% relaxation)  PERF-Power Right: 2/5  PERF-Power Left: 2/5  PERF-Endurance:  2 seconds  PERF-Reps (# to fatigue): n/a  PERF- Flicks: n/a    Cough reflex: bulge with cough      Mild increased muscle tension layer 1 muscles   Significant increased muscle tension layer 3 muscles with TTP      Assessment: Colleen Quigley tolerated today's treatment session well  Patient education provided on PFM relaxation and internal PFM exam findings  Rigotran Unique completed all TE with good form and no adverse reactions  Pt has active inhibition of PFMs  She has significant increased muscle tension in deep PFM and mild increased in muscle tension in superficial PFMs  She has poor eccentric lengthening of PFMs  Improved ability to contract PFMs with coordination of exhale  Titus Clahoun continues to benefit from skilled OPPT services to address dyspareunia and COLLINS, urge incontinence and vaginismus   Will continue to address functional deficits and focus on progression of POC per patient tolerance  Plan: Continue per plan of care  Progress treatment as tolerated         Precautions: standard, fall        5/19/2023 5/26/2023         Visit Number: #1 #2 #3 #4 #5 #6 #7 #8   Patient Ed           PFM anatomy and function [address]          Double void KH (handout)          Bladder diary  KH (handouts) Will set timer for 3-4 hours to void  Will increase water intake                                                      Neuro Re-Ed           Guided meditation   [address]          DB with PFM contraction            Butterfly stretch           janette pose            Happy baby                                  Ther Ex           Warm-up                                                                                         Ther Activity                                 Manual           PFM Exam   25 min                                                      Modalities

## 2023-06-01 ENCOUNTER — OFFICE VISIT (OUTPATIENT)
Age: 43
End: 2023-06-01

## 2023-06-01 DIAGNOSIS — M62.89 PELVIC FLOOR DYSFUNCTION: Primary | ICD-10-CM

## 2023-06-01 DIAGNOSIS — N94.2 VAGINISMUS: ICD-10-CM

## 2023-06-01 DIAGNOSIS — N94.10 DYSPAREUNIA IN FEMALE: ICD-10-CM

## 2023-06-01 DIAGNOSIS — N39.41 URGE INCONTINENCE OF URINE: ICD-10-CM

## 2023-06-01 DIAGNOSIS — N39.3 SUI (STRESS URINARY INCONTINENCE, FEMALE): ICD-10-CM

## 2023-06-06 NOTE — PROGRESS NOTES
Daily Note     Today's date: 2023  Patient name: Pina Mast  : 1980  MRN: [id number]  Referring provider: Colt Awad MD  Dx:   Encounter Diagnosis     ICD-10-CM    1  Pelvic floor dysfunction  M62 89       2  COLLINS (stress urinary incontinence, female)  N39 3       3  Urge incontinence of urine  N39 41       4  Dyspareunia in female  N94 10       5  Vaginismus  N94 2           Start Time: 9888  Stop Time: 1120  Total time in clinic (min): 60 minutes    Subjective: Pt reports urinary symptoms continue to improve  She did order both pelvic wand and dilators  Would like to start with pelvic wand today  Objective: See treatment diary below      Assessment: Pina Mast tolerated today's treatment session well  Patient education provided on home use of pelvic wand and stretching of PFMs   Gume Bowman completed all TE with good form and no adverse reactions  Pt independent with pelvic wand for stretching of PFMs  Will follow up on this next visit  Gume Bowman continues to benefit from skilled OPPT services to address urinary incontinence  and pelvic pain  Will continue to address functional deficits and focus on progression of POC per patient tolerance  Plan: Continue per plan of care  Progress treatment as tolerated         Precautions: standard, fall        2023       Visit Number: #1 #2 #3 #4 #5 #6 #7 #8   Patient Ed           PFM anatomy and function  [address]          Double void  [address] (handout)          Bladder diary  [facility] (handouts) Will set timer for 3-4 hours to void  Will increase water intake  Is more aware of timing and is having less urge UI Reports UI continues to improve and is aware of using the bathroom        Urge deferral     [address]        Bladder function    [address]         Ordering vaginal trainers vs pelvic wand    [address] ordering and benefits and use  Handout on how to use - reviewed with return demo                  Neuro Re-Ed           Guided meditation [address]  [address]        DB with PFM contraction            Butterfly stretch   2 min  2 min       janette pose    2 min  2 min       Happy baby    2  Min  2 min        Cat cow    1 min  1 min                   Ther Ex           Warm-up     NuStep level 2 8 min                                                                                    Ther Activity                                 Manual           PFM Exam   25 min   30 min MFR to PFMs                                                   Modalities

## 2023-06-08 ENCOUNTER — OFFICE VISIT (OUTPATIENT)
Age: 43
End: 2023-06-08
Payer: COMMERCIAL

## 2023-06-08 DIAGNOSIS — N94.10 DYSPAREUNIA IN FEMALE: ICD-10-CM

## 2023-06-08 DIAGNOSIS — N94.2 VAGINISMUS: ICD-10-CM

## 2023-06-08 DIAGNOSIS — M62.89 PELVIC FLOOR DYSFUNCTION: Primary | ICD-10-CM

## 2023-06-08 DIAGNOSIS — N39.3 SUI (STRESS URINARY INCONTINENCE, FEMALE): ICD-10-CM

## 2023-06-08 DIAGNOSIS — N39.41 URGE INCONTINENCE OF URINE: ICD-10-CM

## 2023-06-08 PROCEDURE — 97530 THERAPEUTIC ACTIVITIES: CPT

## 2023-06-08 PROCEDURE — 97110 THERAPEUTIC EXERCISES: CPT

## 2023-06-08 PROCEDURE — 97140 MANUAL THERAPY 1/> REGIONS: CPT

## 2023-06-08 PROCEDURE — 97112 NEUROMUSCULAR REEDUCATION: CPT

## 2023-06-13 ENCOUNTER — OFFICE VISIT (OUTPATIENT)
Age: 43
End: 2023-06-13
Payer: COMMERCIAL

## 2023-06-13 DIAGNOSIS — N94.2 VAGINISMUS: ICD-10-CM

## 2023-06-13 DIAGNOSIS — M62.89 PELVIC FLOOR DYSFUNCTION: Primary | ICD-10-CM

## 2023-06-13 DIAGNOSIS — N39.41 URGE INCONTINENCE OF URINE: ICD-10-CM

## 2023-06-13 DIAGNOSIS — N39.3 SUI (STRESS URINARY INCONTINENCE, FEMALE): ICD-10-CM

## 2023-06-13 DIAGNOSIS — N94.10 DYSPAREUNIA IN FEMALE: ICD-10-CM

## 2023-06-13 PROCEDURE — 97140 MANUAL THERAPY 1/> REGIONS: CPT

## 2023-06-13 PROCEDURE — 97530 THERAPEUTIC ACTIVITIES: CPT

## 2023-06-13 PROCEDURE — 97112 NEUROMUSCULAR REEDUCATION: CPT

## 2023-06-13 NOTE — PROGRESS NOTES
Daily Note     Today's date: 2023  Patient name: Demond Can  : 1980  MRN: [id number]  Referring provider: Terry Perrin MD  Dx:   Encounter Diagnosis     ICD-10-CM    1  Pelvic floor dysfunction  M62 89       2  COLLINS (stress urinary incontinence, female)  N39 3       3  Dyspareunia in female  N94 10       4  Urge incontinence of urine  N39 41       5  Vaginismus  N94 2           Start Time: 830  Stop Time: 930  Total time in clinic (min): 60 minutes    Subjective: Pt reports yesterday she was at work and it was a stressful day and she leaked but not sure what the cause was  It was only a little bit  She is not sure if it was because she didn't go  She used her wand 1 time- she did not feel pain but also did not push it too far  *pts breathing more labored today- she is aware and does note she did not use her inhaler this AM- is thinking it might be because she gained weight- will continue to monitor     Pulse Ox: 98%    Objective: See treatment diary below      Assessment: Demond Can tolerated today's treatment session well  Patient education provided on use of vaginal dialtors/trainers   Prasanna Grande completed all TE with good form and no adverse reactions  Palpable pebble-like stool in rectum  Encouraged pt to drink more water  Prasanna Grande continues to benefit from skilled OPPT services to address urinary incontinence  and pain with vaginal penetration  Will continue to address functional deficits and focus on progression of POC per patient tolerance  Patient performed Nustep to increase blood flow to the area being treated, prepare the muscles for strength training and stretching, improve overall tolerance to activity, and aerobic endurance  Plan: Continue per plan of care  Progress treatment as tolerated         Precautions: standard, fall        2023       Visit Number: #1 #2 #3 #4 #5 #6 #7 #8   Patient Ed           PFM "anatomy and function [address] [facility]          Double void [facility] (handout)          Bladder diary  [facility] (handouts) Will set timer for 3-4 hours to void  Will increase water intake  Is more aware of timing and is having less urge UI Reports UI continues to improve and is aware of using the bathroom        Urge deferral    [address] [facility]        Bladder function   [address]         Ordering vaginal trainers vs pelvic wand   [address] ordering and benefits and use  Handout on how to use - reviewed with return demo Reviewed how to use dilators today - importance of doing this more frequently at home                  Neuro Re-Ed           Guided meditation   [address] [facility]  KH        DB with PFM contraction            Butterfly stretch   2 min  2 min 2 min       janette pose    2 min  2 min 2 min      Happy baby    2  Min  2 min  2 min       Cat cow    1 min  1 min                   Ther Ex           Warm-up     NuStep level 2 8 min NuStep level 2 10 min       LTR     5\" hold x5 each side       Open book stretch      5\" hold x5 each side                                                              Ther Activity                                 Manual           PFM Exam   25 min   30 min MFR to PFMs 25 min MFR to PFMs                                                   Modalities                                                 "

## 2023-06-16 NOTE — PROGRESS NOTES
Daily Note/Re-Evaluation      Today's date: 2023  Patient name: Elisabeth Evans  : 1980  MRN: [id number]  Referring provider: Nancy Alberto MD  Dx:   Encounter Diagnosis     ICD-10-CM    1  Pelvic floor dysfunction  M62 89       2  COLLINS (stress urinary incontinence, female)  N39 3       3  Dyspareunia in female  N94 10       4  Urge incontinence of urine  N39 41       5  Vaginismus  N94 2           Start Time: 820  Stop Time:   Total time in clinic (min): 65 minutes    Subjective: Pt reports she is doing well  Nothing new to report       Bladder Function:     Voiding Difficulties positive for:   Urgency --> improved- not happening as often- now occurring 1 x per week     straining --> resolved    incomplete emptying (most of the time yes but sometimes no) --> improved- feels she is emptying better        Voiding frequency: every 3-4 hours --> same      Urinary leakage: urine leakage --> still leaking a little bit- not leaking every day (leaking when she waits too long to void)    Urinary leakage aggravated by:   Coughing --> resolved     sneezing --> resolved (leaking with laughter)    standing up --> resolved     walking to the bathroom --> only if she waits too long and its just as she approaches toilet     hearing running water --> resolved     post-void dribble --> rarely      Nocturia (episodes per night): 2 --> 0-1 times per night      Painful urination: No  --> same      Intake (ounces) comment: Water 80oz (when at work- when at home not as much) --> same    Coffee 32oz --> same    Ice tea occasionally --> same    Alcohol rarely --> same      Incontinence Management:     Pads/Diaper Use:  24 hours and none --> none      Bowel Function:     Bowel frequency: daily --> 1-2 times per day      [place] Stool Scale: type 3 --> type 3-4    (only when she has poor food choices does she have some FI)    Pain:     Current pain ratin --> 0     At best pain ratin --> 0     At worst pain "ratin --> 4 (with use of pelvic wand)     Location:  Opening of vagina with insertion --> same      Quality:  Throbbing and dull ache --> same     Exacerbated by: vaginal insertion --> same     Duration of symptoms:  6 to 24 hours --> same      Goals  STG to be completed in 8 weeks from 2023:  1  Dayami Keller will be independent with initial home exercise program  MET 2023   2  Dayami Keller will demonstrate improved ROM of PFM with contraction and active lengthening  MET 2023   3  Dayami Keller will be independent with home vaginal  or pelvic wand use, if indicated after internal PFM examination  MET 2023   4  Dayami Keller will demonstrate ability to diaphragmatic breathe to the best of her ability during activity to assist with pain management  MET 2023    5  Dayami Keller will report good understanding of healthy bladder habits and toilet posture  MET 2023   6  Dayami Keller will complete bladder diary activity  MET 2023   7  Dayami Keller will be independent with urge deferral strategy to control bladder leakage and train RAIR  MET 2023   8  Dayami Keller will be independent with double void technique to fully empty bladder  MET 2023   9  Dayami Keller will be independent with \"knack\" for pre-activation of PFMs prior to increase in IAP  MET 2023     LTG to be completed in 12 weeks from 2023:  1  Dayami Keller will be independent with advanced home exercise program for self-management of symptoms  PROGRESSING 2023   2  Dayami Keller will be able to appropriately activate PFM and lengthen PFM with functional tasks for improved mobility of pelvic floor  PROGRESSING 2023   3  Dayami Keller will participate in and report pain-free vaginal insertion for GYN exams  PROGRESSING 2023   4  Dayami Keller will report pain no greater than 1/10 with all functional activity to allow Dayami Keller to return to prior level of function PROGRESSING 2023   5   Dayami Keller will be able to participate in " pain-free vaginal examination to allow for health maintenance and monitoring  PROGRESSING 6/22/2023   6  Rolando Carmichael report 90% improvement or better in UI  PROGRESSING 6/22/2023 (pt report 85%)  7  Rolando Carmichael will report needing to change underwear 1x per 2 weeks or less due to UI  MET 6/22/2023   8  Rolando Carmichael will have no episodes of FI for 1 month  MET 6/22/2023   9  Rolando Carmichael will report waking 1 or less times a night to urinate to allow them to have better sleep quality  MET 6/22/2023   10   Rolando Carmichael will demonstrate ability to appropriately activate deep core muscles with functional mobility tasks  PROGRESSING 6/22/2023      New Goals to be met in 8 weeks from 6/22/2023:  1  Rolando Carmichael will be independent with advanced home exercise program for self-management of symptoms  [address] [facility] will be able to appropriately activate PFM and lengthen PFM with functional tasks for improved mobility of pelvic floor  [facility] will participate in and report pain-free vaginal insertion for GYN exams  [facility] will report pain no greater than 1/10 with all functional activity to allow Rolando Carmichael to return to prior level of function   [address] [facility] [place] will be able to participate in pain-free vaginal examination to allow for health maintenance and monitoring  6  Rolando Carmichael report 90% improvement or better in UI    7   Rolando Carmichael will demonstrate ability to appropriately activate deep core muscles with functional mobility tasks  Objective: See treatment diary below      Assessment: Rolando Carmichael is a 38y o  year old female with diagnosis of pelvic floor dysfunction  Rolando Carmichael has attended 6 therapy sessions from start of POC  The following impairments were found on re-evaluation: continued high muscle tone of PFMs  They have made the following improvements from start of plan of care: decreasing UI and FI, urinary urgency   These impairments contribute to the following functional limitations: high muscle tone and painful vaginal insertion for GYN exams; and the following disability: decreased quality of life  Shahla Cornelius  is a good candidate for therapy and would benefit from skilled physical therapy to address the above impairments in order to allow the patient to achieve below goals and return to her prior level of function  Patient educated on diagnosis, plan of care and prognosis  They are in agreement with recommended plan of care, goals for therapy and demonstrates motivation for active participation in proposed plan of care  Patient performed Nustep to increase blood flow to the area being treated, prepare the muscles for strength training and stretching, improve overall tolerance to activity, and aerobic endurance  Plan: Continue per plan of care  Progress treatment as tolerated       Plan  Patient would benefit from: skilled physical therapy  Planned modality interventions: biofeedback, cryotherapy, thermotherapy: hydrocollator packs, traction and ultrasound  Planned therapy interventions: abdominal trunk stabilization, activity modification, ADL retraining, balance, balance/weight bearing training, behavior modification, body mechanics training, breathing training, coordination, fine motor coordination training, flexibility, functional ROM exercises, gait training, graded activity, graded exercise, graded motor, home exercise program, joint mobilization, manual therapy, massage, motor coordination training, neuromuscular re-education, patient education, postural training, strengthening, stretching, therapeutic activities and therapeutic exercise  Frequency: 1x week  Duration in weeks: 8  Plan of Care beginning date: 6/22/2023   Plan of Care expiration date: 8/17/2023  Treatment plan discussed with: patient, PTA and referring physician       Precautions: standard, fall        5/19/2023 5/26/2023 6/1/2023 6/8/2023 6/13/2023 6/22/2023     Visit Number: #1 #2 #3 #4 #5 #6 #7 #8   Patient Ed "  PFM anatomy and function [facility]          Double void [facility] (handout)          Bladder diary  [facility] (handouts) Will set timer for 3-4 hours to void  Will increase water intake  Is more aware of timing and is having less urge UI Reports UI continues to improve and is aware of using the bathroom        Urge deferral    [address]        Bladder function   [address]         Ordering vaginal trainers vs pelvic wand   [address] ordering and benefits and use  Handout on how to use - reviewed with return demo Reviewed how to use dilators today - importance of doing this more frequently at home  Reviewed progression of dilators                 Neuro Re-Ed           Guided meditation   [address]  KH        DB with PFM contraction       2 min      Butterfly stretch   2 min  2 min 2 min  2 min     janette pose    2 min  2 min 2 min 2 min      Happy baby    2  Min  2 min  2 min  2 min     Cat cow    1 min  1 min                   Ther Ex           Warm-up     NuStep level 2 8 min NuStep level 2 10 min  NuStep level 2 8 min      LTR     5\" hold x5 each side       Open book stretch      5\" hold x5 each side                                                              Ther Activity                                 Manual           PFM Exam   25 min   30 min MFR to PFMs 25 min MFR to PFMs  25 min MFR to PFMs                                                  Modalities                                                   "

## 2023-06-22 ENCOUNTER — OFFICE VISIT (OUTPATIENT)
Age: 43
End: 2023-06-22
Payer: COMMERCIAL

## 2023-06-22 DIAGNOSIS — N39.3 SUI (STRESS URINARY INCONTINENCE, FEMALE): ICD-10-CM

## 2023-06-22 DIAGNOSIS — N94.2 VAGINISMUS: ICD-10-CM

## 2023-06-22 DIAGNOSIS — M62.89 PELVIC FLOOR DYSFUNCTION: Primary | ICD-10-CM

## 2023-06-22 DIAGNOSIS — N39.41 URGE INCONTINENCE OF URINE: ICD-10-CM

## 2023-06-22 DIAGNOSIS — N94.10 DYSPAREUNIA IN FEMALE: ICD-10-CM

## 2023-06-22 PROCEDURE — 97110 THERAPEUTIC EXERCISES: CPT

## 2023-06-22 PROCEDURE — 97112 NEUROMUSCULAR REEDUCATION: CPT

## 2023-06-22 PROCEDURE — 97140 MANUAL THERAPY 1/> REGIONS: CPT

## 2023-06-22 PROCEDURE — 97164 PT RE-EVAL EST PLAN CARE: CPT

## 2023-06-22 PROCEDURE — 97530 THERAPEUTIC ACTIVITIES: CPT

## 2023-07-05 NOTE — PROGRESS NOTES
Daily Note     Today's date: 2023  Patient name: Bettyann Bumpers  : 1980  MRN: [id number]  Referring provider: Mercedes Acevedo MD  Dx:   Encounter Diagnosis     ICD-10-CM    1. Pelvic floor dysfunction  M62.89       2. COLLINS (stress urinary incontinence, female)  N39.3       3. Dyspareunia in female  N94.10       4. Urge incontinence of urine  N39.41       5. Vaginismus  N94.2           Start Time: 08  Stop Time: 930  Total time in clinic (min): 60 minutes    Subjective: Pt reports she has been using pelvic wand 2x per week. She was able to insert 2 fingers with no pain. She is pleased with this progress. Bowels are fine. Bladder she had one episode of UI since last visit- very little dribble when she was laughing. She has thought about trying to use tampons. She is       Objective: See treatment diary below      Assessment: Bettyann Bumpers tolerated today's treatment session well. Patient education provided on use of tampon and importance of using dilators for stretch of introitus. Severiano Host completed all TE with good form and no adverse reactions. Severiano Host continues to benefit from skilled OPPT services to address dyspareunia and pain with vaginal insertion. Will continue to address functional deficits and focus on progression of POC per patient tolerance. Patient performed Nustep to increase blood flow to the area being treated, prepare the muscles for strength training and stretching, improve overall tolerance to activity, and aerobic endurance. Plan: Continue per plan of care. Progress treatment as tolerated.        Precautions: standard, fall        2023    Visit Number: #1 #2 #3 #4 #5 #6 #7 #8   Patient Ed           PFM anatomy and function [address]          Double void [address] (handout)          Bladder diary  [facility] (handouts) Will set timer for 3-4 hours to void  Will increase water intake  Is more aware of timing and is having less urge UI Reports UI continues to improve and is aware of using the bathroom        Urge deferral    [address]        Bladder function   [address]         Ordering vaginal trainers vs pelvic wand   [address] ordering and benefits and use  Handout on how to use - reviewed with return demo Reviewed how to use dilators today - importance of doing this more frequently at home  Reviewed progression of dilators      Use of tampons        Suggested using smaller size and try using some lube- performed demonstration with tampon                           Neuro Re-Ed           Guided meditation   [facility]        DB with PFM contraction       2 min  2 min     Butterfly stretch   2 min  2 min 2 min  2 min 2 min     janette pose    2 min  2 min 2 min 2 min  2 min     Happy baby    2  Min  2 min  2 min  2 min 2 min     Cat cow    1 min  1 min                   Ther Ex           Warm-up     NuStep level 2 8 min NuStep level 2 10 min  NuStep level 2 8 min  NuStep level 4 8 min     LTR     5" hold x5 each side       Open book stretch      5" hold x5 each side                                                              Ther Activity                                 Manual           PFM Exam   25 min   30 min MFR to PFMs 25 min MFR to PFMs  25 min MFR to PFMs  25 min MFR to PFMs (able to accept 2 finger insertion to second knuckle)                                                Modalities

## 2023-07-06 ENCOUNTER — OFFICE VISIT (OUTPATIENT)
Age: 43
End: 2023-07-06
Payer: COMMERCIAL

## 2023-07-06 DIAGNOSIS — N39.41 URGE INCONTINENCE OF URINE: ICD-10-CM

## 2023-07-06 DIAGNOSIS — N94.10 DYSPAREUNIA IN FEMALE: ICD-10-CM

## 2023-07-06 DIAGNOSIS — N39.3 SUI (STRESS URINARY INCONTINENCE, FEMALE): ICD-10-CM

## 2023-07-06 DIAGNOSIS — N94.2 VAGINISMUS: ICD-10-CM

## 2023-07-06 DIAGNOSIS — M62.89 PELVIC FLOOR DYSFUNCTION: Primary | ICD-10-CM

## 2023-07-06 PROCEDURE — 97110 THERAPEUTIC EXERCISES: CPT

## 2023-07-06 PROCEDURE — 97140 MANUAL THERAPY 1/> REGIONS: CPT

## 2023-07-06 PROCEDURE — 97530 THERAPEUTIC ACTIVITIES: CPT

## 2023-07-06 PROCEDURE — 97112 NEUROMUSCULAR REEDUCATION: CPT

## 2023-07-20 ENCOUNTER — OFFICE VISIT (OUTPATIENT)
Age: 43
End: 2023-07-20
Payer: COMMERCIAL

## 2023-07-20 DIAGNOSIS — M62.89 PELVIC FLOOR DYSFUNCTION: Primary | ICD-10-CM

## 2023-07-20 DIAGNOSIS — N94.2 VAGINISMUS: ICD-10-CM

## 2023-07-20 DIAGNOSIS — N39.3 SUI (STRESS URINARY INCONTINENCE, FEMALE): ICD-10-CM

## 2023-07-20 DIAGNOSIS — N94.10 DYSPAREUNIA IN FEMALE: ICD-10-CM

## 2023-07-20 DIAGNOSIS — N39.41 URGE INCONTINENCE OF URINE: ICD-10-CM

## 2023-07-20 PROCEDURE — 97110 THERAPEUTIC EXERCISES: CPT

## 2023-07-20 PROCEDURE — 97112 NEUROMUSCULAR REEDUCATION: CPT

## 2023-07-20 PROCEDURE — 97140 MANUAL THERAPY 1/> REGIONS: CPT

## 2023-07-20 NOTE — PROGRESS NOTES
Daily Note     Today's date: 2023  Patient name: Gilbert Marques  : 1980  MRN: [id number]  Referring provider: Andres Benavides MD  Dx:   Encounter Diagnosis     ICD-10-CM    1. Pelvic floor dysfunction  M62.89       2. COLLINS (stress urinary incontinence, female)  N39.3       3. Dyspareunia in female  N94.10       4. Urge incontinence of urine  N39.41       5. Vaginismus  N94.2           Start Time: 5712  Stop Time: 5805  Total time in clinic (min): 45 minutes    Subjective: Pt reports she is doing well. Nothing new. She was able to progress to next size of vaginal . Did not try tampon as she did not get her period yet. Objective: See treatment diary below      Assessment: Gilbert Marques tolerated today's treatment session well. Patient education provided on insertion of small speculum for practice for vaginal exam.  Tawanda Zimmerman completed all TE with good form and no adverse reactions. Pt able to accept insertion of small plastic speculum and tolerate opening to about 50%, with some pain/discomfort at end of opening. Pt reported having no anxiety associated with this today which is a big improvement from prior. Tawanda Zimmerman continues to benefit from skilled OPPT services to address vaginismus. Will continue to address functional deficits and focus on progression of POC per patient tolerance. Patient performed Nustep to increase blood flow to the area being treated, prepare the muscles for strength training and stretching, improve overall tolerance to activity, and aerobic endurance. Plan: Continue per plan of care. Progress treatment as tolerated.        Precautions: standard, fall        2023    Visit Number: #1 #2 #3 #4 #5 #6 #7 #8   Patient Ed           PFM anatomy and function [address]          Double void [address] (handout)          Bladder diary  [facility] (handouts) Will set timer for 3-4 hours to void  Will increase water intake  Is more aware of timing and is having less urge UI Reports UI continues to improve and is aware of using the bathroom        Urge deferral    [address] [facility]        Bladder function   [address] [facility]         Ordering vaginal trainers vs pelvic wand   [address] [facility] ordering and benefits and use  Handout on how to use - reviewed with return demo Reviewed how to use dilators today - importance of doing this more frequently at home  Reviewed progression of dilators      Use of tampons        Suggested using smaller size and try using some lube- performed demonstration with tampon  Has not had opportunity to try this                          Neuro Re-Ed           Guided meditation   [address] [facility]  KH        DB with PFM contraction       2 min  2 min  3 min    Butterfly stretch   2 min  2 min 2 min  2 min 2 min  2 min    janette pose    2 min  2 min 2 min 2 min  2 min  2 min    Happy baby    2  Min  2 min  2 min  2 min 2 min  2 min    Cat cow    1 min  1 min                   Ther Ex           Warm-up     NuStep level 2 8 min NuStep level 2 10 min  NuStep level 2 8 min  NuStep level 4 8 min  NuStep level 4 8 min    LTR     5" hold x5 each side       Open book stretch      5" hold x5 each side                                                              Ther Activity                                 Manual           PFM Exam   25 min   30 min MFR to PFMs 25 min MFR to PFMs  25 min MFR to PFMs  25 min MFR to PFMs (able to accept 2 finger insertion to second knuckle) 29 min PFM MFR and use of speculum to practice for medical exam by OBGYN                                               Modalities Body Location Override (Optional - Billing Will Still Be Based On Selected Body Map Location If Applicable): left inferior central malar cheek

## 2023-07-28 NOTE — PROGRESS NOTES
Daily Note     Today's date: 2023  Patient name: Claudia Stovall  : 1980  MRN: [id number]  Referring provider: Emily Huerta MD  Dx:   Encounter Diagnosis     ICD-10-CM    1. COLLINS (stress urinary incontinence, female)  N39.3       2. Pelvic floor dysfunction  M62.89       3. Dyspareunia in female  N94.10       4. Urge incontinence of urine  N39.41       5. Vaginismus  N94.2           Start Time: 0900  Stop Time: 06  Total time in clinic (min): 45 minutes    Subjective: Pt reports she leaves for vacation tomorrow. She is very stressed about all she has to do before she leaves. Pt reports she was able to use a tampon last week. Had some difficulty with inserting but with increased time was able to do it. Objective: See treatment diary below      Assessment: Claudia Stovall tolerated today's treatment session well. Patient education provided on PFM relaxation strategies to use during insertion of speculum. Francisca Marx completed all TE with good form and no adverse reactions. Pt able to accept insertion of two fingers with some stretching. She was able to accept insertion of speculum with 75% opening. Francisca Marx continues to benefit from skilled OPPT services to address pain with insertion of vagina. Will continue to address functional deficits and focus on progression of POC per patient tolerance. Patient performed Nustep to increase blood flow to the area being treated, prepare the muscles for strength training and stretching, improve overall tolerance to activity, and aerobic endurance. Plan: Continue per plan of care. Progress treatment as tolerated.        Precautions: standard, fall        2023    Visit Number:      #6 #7 #8   Patient Ed           PFM anatomy and function           Double void           Bladder diary            Urge deferral            Bladder function           Ordering vaginal trainers vs pelvic wand      Reviewed progression of dilators      Use of tampons        Suggested using smaller size and try using some lube- performed demonstration with tampon  Has not had opportunity to try this                          Neuro Re-Ed           Guided meditation            DB with PFM contraction  DB with focus on relaxation 5 min      2 min  2 min  3 min    Butterfly stretch      2 min 2 min  2 min    janette pose       2 min  2 min  2 min    Happy baby       2 min 2 min  2 min    Cat cow                       Ther Ex           Warm-up  NuStep level 4 8 min      NuStep level 2 8 min  NuStep level 4 8 min  NuStep level 4 8 min    LTR           Open book stretch                                                                   Ther Activity                                 Manual           PFM Exam  25 min PFM MFR and use of speculum to practice for medical exam by OBGYN     25 min MFR to PFMs  25 min MFR to PFMs (able to accept 2 finger insertion to second knuckle) 29 min PFM MFR and use of speculum to practice for medical exam by OBGYN                                               Modalities

## 2023-08-01 ENCOUNTER — OFFICE VISIT (OUTPATIENT)
Age: 43
End: 2023-08-01
Payer: COMMERCIAL

## 2023-08-01 DIAGNOSIS — N39.41 URGE INCONTINENCE OF URINE: ICD-10-CM

## 2023-08-01 DIAGNOSIS — N94.2 VAGINISMUS: ICD-10-CM

## 2023-08-01 DIAGNOSIS — N39.3 SUI (STRESS URINARY INCONTINENCE, FEMALE): Primary | ICD-10-CM

## 2023-08-01 DIAGNOSIS — M62.89 PELVIC FLOOR DYSFUNCTION: ICD-10-CM

## 2023-08-01 DIAGNOSIS — N94.10 DYSPAREUNIA IN FEMALE: ICD-10-CM

## 2023-08-01 PROCEDURE — 97110 THERAPEUTIC EXERCISES: CPT

## 2023-08-01 PROCEDURE — 97112 NEUROMUSCULAR REEDUCATION: CPT

## 2023-08-01 PROCEDURE — 97140 MANUAL THERAPY 1/> REGIONS: CPT

## 2023-08-14 NOTE — PROGRESS NOTES
Daily Note/Discharge Summary    Today's date: 2023  Patient name: Aubrey Parker  : 1980  MRN: [id number]  Referring provider: Olena Gallo MD  Dx:   Encounter Diagnosis     ICD-10-CM    1. COLLINS (stress urinary incontinence, female)  N39.3       2. Pelvic floor dysfunction  M62.89       3. Dyspareunia in female  N94.10       4. Urge incontinence of urine  N39.41       5. Vaginismus  N94.2           Start Time: 7868  Stop Time: 1030  Total time in clinic (min): 35 minutes    Subjective: Pt reports she tried using a tampon. It did not hurt with insertion but did feel painful while in. Bladder Function:     Voiding Difficulties positive for:   Urgency --> improved- not happening as often- now occurring 1 x per week  --> rarely       Nocturia (episodes per night): 2 --> 0-1 times per night  --> 0     Pain:     Current pain ratin --> 0 --> 0     At best pain ratin --> 0 --> 0     At worst pain ratin --> 4 (with use of pelvic wand) --> 3 (when wearing tampon)       Goals  New Goals to be met in 8 weeks from 2023:  1. Dierdre Fraction will be independent with advanced home exercise program for self-management of symptoms. MET 2023   2. Dierdre Fraction will be able to appropriately activate PFM and lengthen PFM with functional tasks for improved mobility of pelvic floor. MET 2023   3. Dierdre Fraction will participate in and report pain-free vaginal insertion for GYN exams. MET 2023   4. Dierdre Fraction will report pain no greater than 1/10 with all functional activity to allow Dierdre Fraction to return to prior level of function MET 2023   5. Dierdre Fraction will be able to participate in pain-free vaginal examination to allow for health maintenance and monitoring. MET 2023   6. Dierdre Fraction report 90% improvement or better in UI. MET 2023   7. Dierdre Fraction will demonstrate ability to appropriately activate deep core muscles with functional mobility tasks.   MET 2023       Objective: See treatment diary below      Assessment: Amena Velez has had improvement in symptoms since starting PFPT. Amena Velez has decreased UI, FI and pain with insertion for GYN exams and use of tampons and is beginning to increase their activity level without symptoms. Amena Velez is independent with managing symptoms at home and is appropriate for discharge at this time. Amena Velez agreeable to this. Amena Velez encouraged to call this office if they have any questions. They would need a new referral if they wish to return to PFPT. Patient performed Nustep to increase blood flow to the area being treated, prepare the muscles for strength training and stretching, improve overall tolerance to activity, and aerobic endurance. Plan: Continue per plan of care. Progress treatment as tolerated.      Plan  Patient would benefit from: skilled physical therapy  Planned modality interventions: biofeedback, cryotherapy, thermotherapy: hydrocollator packs, traction and ultrasound  Planned therapy interventions: abdominal trunk stabilization, activity modification, ADL retraining, balance, balance/weight bearing training, behavior modification, body mechanics training, breathing training, coordination, fine motor coordination training, flexibility, functional ROM exercises, gait training, graded activity, graded exercise, graded motor, home exercise program, joint mobilization, manual therapy, massage, motor coordination training, neuromuscular re-education, patient education, postural training, strengthening, stretching, therapeutic activities and therapeutic exercise  Frequency: 1x week  Duration in weeks: 1  Plan of Care beginning date: 8/17/2023  Plan of Care expiration date: 8/17/2023   Treatment plan discussed with: patient, PTA and referring physician       Precautions: standard, fall        8/1/2023 8/17/2023 6/22/2023 7/6/2023 7/20/2023    Visit Number:      #6 #7 #8   Patient Ed PFM anatomy and function           Double void           Bladder diary            Urge deferral            Bladder function           Ordering vaginal trainers vs pelvic wand      Reviewed progression of dilators      Use of tampons        Suggested using smaller size and try using some lube- performed demonstration with tampon  Has not had opportunity to try this                          Neuro Re-Ed           Guided meditation            DB with PFM contraction  DB with focus on relaxation 5 min      2 min  2 min  3 min    Butterfly stretch      2 min 2 min  2 min    janette pose       2 min  2 min  2 min    Happy baby       2 min 2 min  2 min    Cat cow                       Ther Ex           Warm-up  NuStep level 4 8 min  NuStep level 4 8 min    NuStep level 2 8 min  NuStep level 4 8 min  NuStep level 4 8 min    LTR           Open book stretch                                                                   Ther Activity                                 Manual           PFM Exam  25 min PFM MFR and use of speculum to practice for medical exam by OBGYN 15 min MFR - good relaxed muscles no TTP    25 min MFR to PFMs  25 min MFR to PFMs (able to accept 2 finger insertion to second knuckle) 29 min PFM MFR and use of speculum to practice for medical exam by OBGYN                                               Modalities

## 2023-08-17 ENCOUNTER — EVALUATION (OUTPATIENT)
Age: 43
End: 2023-08-17
Payer: COMMERCIAL

## 2023-08-17 DIAGNOSIS — N94.10 DYSPAREUNIA IN FEMALE: ICD-10-CM

## 2023-08-17 DIAGNOSIS — N94.2 VAGINISMUS: ICD-10-CM

## 2023-08-17 DIAGNOSIS — N39.41 URGE INCONTINENCE OF URINE: ICD-10-CM

## 2023-08-17 DIAGNOSIS — M62.89 PELVIC FLOOR DYSFUNCTION: ICD-10-CM

## 2023-08-17 DIAGNOSIS — N39.3 SUI (STRESS URINARY INCONTINENCE, FEMALE): Primary | ICD-10-CM

## 2023-08-17 PROCEDURE — 97530 THERAPEUTIC ACTIVITIES: CPT

## 2023-08-17 PROCEDURE — 97110 THERAPEUTIC EXERCISES: CPT

## 2023-08-17 PROCEDURE — 97140 MANUAL THERAPY 1/> REGIONS: CPT

## 2023-12-28 ENCOUNTER — HOSPITAL ENCOUNTER (OUTPATIENT)
Dept: RADIOLOGY | Facility: CLINIC | Age: 43
End: 2023-12-28
Payer: COMMERCIAL

## 2023-12-28 VITALS — WEIGHT: 180 LBS | HEIGHT: 61 IN | BODY MASS INDEX: 33.99 KG/M2

## 2023-12-28 DIAGNOSIS — Z12.31 ENCOUNTER FOR SCREENING MAMMOGRAM FOR MALIGNANT NEOPLASM OF BREAST: ICD-10-CM

## 2023-12-28 PROCEDURE — 77067 SCR MAMMO BI INCL CAD: CPT

## 2023-12-28 PROCEDURE — 77063 BREAST TOMOSYNTHESIS BI: CPT

## 2024-02-09 ENCOUNTER — ANNUAL EXAM (OUTPATIENT)
Dept: OBGYN CLINIC | Facility: MEDICAL CENTER | Age: 44
End: 2024-02-09
Payer: COMMERCIAL

## 2024-02-09 VITALS
BODY MASS INDEX: 33.53 KG/M2 | SYSTOLIC BLOOD PRESSURE: 140 MMHG | WEIGHT: 177.6 LBS | HEIGHT: 61 IN | DIASTOLIC BLOOD PRESSURE: 70 MMHG

## 2024-02-09 DIAGNOSIS — Z12.4 PAP SMEAR FOR CERVICAL CANCER SCREENING: ICD-10-CM

## 2024-02-09 DIAGNOSIS — Z12.31 BREAST CANCER SCREENING BY MAMMOGRAM: ICD-10-CM

## 2024-02-09 DIAGNOSIS — Z01.419 ENCOUNTER FOR WELL WOMAN EXAM WITH ROUTINE GYNECOLOGICAL EXAM: Primary | ICD-10-CM

## 2024-02-09 DIAGNOSIS — N92.6 IRREGULAR MENSES: ICD-10-CM

## 2024-02-09 PROCEDURE — G0143 SCR C/V CYTO,THINLAYER,RESCR: HCPCS | Performed by: OBSTETRICS & GYNECOLOGY

## 2024-02-09 PROCEDURE — 99396 PREV VISIT EST AGE 40-64: CPT | Performed by: OBSTETRICS & GYNECOLOGY

## 2024-02-09 PROCEDURE — G0476 HPV COMBO ASSAY CA SCREEN: HCPCS | Performed by: OBSTETRICS & GYNECOLOGY

## 2024-02-09 RX ORDER — VALSARTAN 80 MG/1
TABLET ORAL
COMMUNITY
Start: 2023-05-26

## 2024-02-09 NOTE — PROGRESS NOTES
"[facility]  [address], [address], [place]    ASSESSMENT/PLAN: Maggie Iraheta is a 43 y.o.  who presents for annual gynecologic exam.    Encounter for routine gynecologic examination  - Routine well woman exam completed today.  - Cervical Cancer Screening: Current ASCCP Guidelines reviewed. Last Pap: 2022 . Next Pap Due: today  - Contraceptive counseling discussed.  Current contraception: none     Additional problems addressed during this visit:  1. Irregular menses  -     Follicle stimulating hormone; Future  -     Luteinizing hormone; Future  -     TSH, 3rd generation with Free T4 reflex; Future  -     Testosterone, free, total; Future  -     DHEA-sulfate; Future  -     US pelvis complete w transvaginal; Future    2. Breast cancer screening by mammogram  -     Mammo screening bilateral w 3d & cad; Future    3. Pap smear for cervical cancer screening  -     Liquid-based pap, screening      CC:  Annual Gynecologic Examination    HPI: Maggie Iraheta is a 43 y.o.  who presents for annual gynecologic examination.  Gynecologic Exam      Patient presents for routine annual exam.  She did well with PFPT, now able to use toys and tampons. Has not had intercourse yet.  States her cycles are irregular, every 2 months; will check labs and ultrasound.   She also reports vulvar lumps.     The following portions of the patient's history were reviewed and updated as appropriate: allergies, current medications, past family history, past medical history, obstetric history, gynecologic history, past social history, past surgical history and problem list.    Review of Systems   Constitutional: Negative.    HENT: Negative.     Eyes: Negative.    Respiratory: Negative.     Cardiovascular: Negative.    Gastrointestinal: Negative.    Genitourinary: Negative.    Musculoskeletal: Negative.    All other systems reviewed and are negative.        Objective:  /70   Ht 5' 1\" " (1.549 m)   Wt 80.6 kg (177 lb 9.6 oz)   LMP 01/03/2024 (Exact Date)   BMI 33.56 kg/m²    Physical Exam  Vitals reviewed.   Constitutional:       General: She is not in acute distress.     Appearance: She is well-developed.   HENT:      Head: Normocephalic and atraumatic.      Nose: Nose normal.   Cardiovascular:      Rate and Rhythm: Normal rate.   Pulmonary:      Effort: Pulmonary effort is normal. No respiratory distress.   Chest:   Breasts:     Breasts are symmetrical.      Right: Normal. No mass, nipple discharge, skin change or tenderness.      Left: Normal. No mass, nipple discharge, skin change or tenderness.   Abdominal:      General: There is no distension.      Palpations: Abdomen is soft. There is no mass.      Tenderness: There is no abdominal tenderness. There is no guarding or rebound.   Genitourinary:     General: Normal vulva.      Exam position: Lithotomy position.      Labia:         Right: No lesion.         Left: No lesion.       Urethra: No prolapse (urethral meatus normal).      Vagina: Normal. No vaginal discharge, erythema or bleeding.      Cervix: Normal.      Uterus: Normal.       Adnexa: Right adnexa normal and left adnexa normal.          Comments: Skin tag noted   Musculoskeletal:         General: Normal range of motion.      Cervical back: Normal range of motion.   Lymphadenopathy:      Upper Body:      Right upper body: No supraclavicular, axillary or pectoral adenopathy.      Left upper body: No supraclavicular, axillary or pectoral adenopathy.      Lower Body: No right inguinal adenopathy. No left inguinal adenopathy.   Skin:     General: Skin is warm and dry.   Neurological:      Mental Status: She is alert and oriented to person, place, and time.   Psychiatric:         Behavior: Behavior normal.         Thought Content: Thought content normal.         Judgment: Judgment normal.

## 2024-02-16 LAB
LAB AP GYN PRIMARY INTERPRETATION: NORMAL
Lab: NORMAL

## 2024-06-03 ENCOUNTER — OFFICE VISIT (OUTPATIENT)
Dept: FAMILY MEDICINE CLINIC | Facility: CLINIC | Age: 44
End: 2024-06-03
Payer: COMMERCIAL

## 2024-06-03 ENCOUNTER — APPOINTMENT (OUTPATIENT)
Dept: LAB | Facility: CLINIC | Age: 44
End: 2024-06-03
Payer: COMMERCIAL

## 2024-06-03 VITALS
OXYGEN SATURATION: 99 % | BODY MASS INDEX: 33.53 KG/M2 | SYSTOLIC BLOOD PRESSURE: 130 MMHG | HEART RATE: 78 BPM | HEIGHT: 61 IN | WEIGHT: 177.6 LBS | TEMPERATURE: 97 F | DIASTOLIC BLOOD PRESSURE: 82 MMHG

## 2024-06-03 DIAGNOSIS — J06.9 UPPER RESPIRATORY TRACT INFECTION, UNSPECIFIED TYPE: ICD-10-CM

## 2024-06-03 DIAGNOSIS — E78.5 DYSLIPIDEMIA: ICD-10-CM

## 2024-06-03 DIAGNOSIS — J38.6 LARYNGEAL STENOSIS: ICD-10-CM

## 2024-06-03 DIAGNOSIS — F41.8 DEPRESSION WITH ANXIETY: ICD-10-CM

## 2024-06-03 DIAGNOSIS — G47.33 OSA (OBSTRUCTIVE SLEEP APNEA): ICD-10-CM

## 2024-06-03 DIAGNOSIS — I10 PRIMARY HYPERTENSION: ICD-10-CM

## 2024-06-03 DIAGNOSIS — E66.9 CLASS 1 OBESITY WITH SERIOUS COMORBIDITY AND BODY MASS INDEX (BMI) OF 33.0 TO 33.9 IN ADULT, UNSPECIFIED OBESITY TYPE: ICD-10-CM

## 2024-06-03 DIAGNOSIS — J45.909 UNCOMPLICATED ASTHMA, UNSPECIFIED ASTHMA SEVERITY, UNSPECIFIED WHETHER PERSISTENT: ICD-10-CM

## 2024-06-03 DIAGNOSIS — N92.6 IRREGULAR MENSES: ICD-10-CM

## 2024-06-03 DIAGNOSIS — J38.02 VOCAL CORD PARALYSIS, BILATERAL COMPLETE: ICD-10-CM

## 2024-06-03 DIAGNOSIS — Z76.89 ENCOUNTER TO ESTABLISH CARE: Primary | ICD-10-CM

## 2024-06-03 DIAGNOSIS — K21.9 GASTROESOPHAGEAL REFLUX DISEASE WITHOUT ESOPHAGITIS: ICD-10-CM

## 2024-06-03 PROBLEM — F41.9 ANXIETY: Status: RESOLVED | Noted: 2022-10-07 | Resolved: 2024-06-03

## 2024-06-03 LAB
ALBUMIN SERPL BCP-MCNC: 4.4 G/DL (ref 3.5–5)
ALP SERPL-CCNC: 39 U/L (ref 34–104)
ALT SERPL W P-5'-P-CCNC: 14 U/L (ref 7–52)
ANION GAP SERPL CALCULATED.3IONS-SCNC: 11 MMOL/L (ref 4–13)
AST SERPL W P-5'-P-CCNC: 23 U/L (ref 13–39)
BILIRUB SERPL-MCNC: 0.48 MG/DL (ref 0.2–1)
BUN SERPL-MCNC: 12 MG/DL (ref 5–25)
CALCIUM SERPL-MCNC: 9.3 MG/DL (ref 8.4–10.2)
CHLORIDE SERPL-SCNC: 102 MMOL/L (ref 96–108)
CHOLEST SERPL-MCNC: 227 MG/DL
CO2 SERPL-SCNC: 25 MMOL/L (ref 21–32)
CREAT SERPL-MCNC: 0.63 MG/DL (ref 0.6–1.3)
CREAT UR-MCNC: 169.4 MG/DL
ERYTHROCYTE [DISTWIDTH] IN BLOOD BY AUTOMATED COUNT: 13.4 % (ref 11.6–15.1)
EST. AVERAGE GLUCOSE BLD GHB EST-MCNC: 111 MG/DL
FSH SERPL-ACNC: 3.3 MIU/ML
GFR SERPL CREATININE-BSD FRML MDRD: 111 ML/MIN/1.73SQ M
GLUCOSE P FAST SERPL-MCNC: 83 MG/DL (ref 65–99)
HBA1C MFR BLD: 5.5 %
HCT VFR BLD AUTO: 45.1 % (ref 34.8–46.1)
HDLC SERPL-MCNC: 70 MG/DL
HGB BLD-MCNC: 14.6 G/DL (ref 11.5–15.4)
LDLC SERPL CALC-MCNC: 132 MG/DL (ref 0–100)
LH SERPL-ACNC: 6.7 MIU/ML
MCH RBC QN AUTO: 30 PG (ref 26.8–34.3)
MCHC RBC AUTO-ENTMCNC: 32.4 G/DL (ref 31.4–37.4)
MCV RBC AUTO: 93 FL (ref 82–98)
MICROALBUMIN UR-MCNC: 13.8 MG/L
MICROALBUMIN/CREAT 24H UR: 8 MG/G CREATININE (ref 0–30)
PLATELET # BLD AUTO: 345 THOUSANDS/UL (ref 149–390)
PMV BLD AUTO: 10.7 FL (ref 8.9–12.7)
POTASSIUM SERPL-SCNC: 3.9 MMOL/L (ref 3.5–5.3)
PROT SERPL-MCNC: 7.8 G/DL (ref 6.4–8.4)
RBC # BLD AUTO: 4.87 MILLION/UL (ref 3.81–5.12)
SODIUM SERPL-SCNC: 138 MMOL/L (ref 135–147)
TRIGL SERPL-MCNC: 125 MG/DL
TSH SERPL DL<=0.05 MIU/L-ACNC: 2.5 UIU/ML (ref 0.45–4.5)
WBC # BLD AUTO: 8.6 THOUSAND/UL (ref 4.31–10.16)

## 2024-06-03 PROCEDURE — 85027 COMPLETE CBC AUTOMATED: CPT

## 2024-06-03 PROCEDURE — 82043 UR ALBUMIN QUANTITATIVE: CPT

## 2024-06-03 PROCEDURE — 84402 ASSAY OF FREE TESTOSTERONE: CPT

## 2024-06-03 PROCEDURE — 99204 OFFICE O/P NEW MOD 45 MIN: CPT | Performed by: FAMILY MEDICINE

## 2024-06-03 PROCEDURE — 80053 COMPREHEN METABOLIC PANEL: CPT

## 2024-06-03 PROCEDURE — 80061 LIPID PANEL: CPT

## 2024-06-03 PROCEDURE — 83002 ASSAY OF GONADOTROPIN (LH): CPT

## 2024-06-03 PROCEDURE — 83036 HEMOGLOBIN GLYCOSYLATED A1C: CPT

## 2024-06-03 PROCEDURE — 82627 DEHYDROEPIANDROSTERONE: CPT

## 2024-06-03 PROCEDURE — 36415 COLL VENOUS BLD VENIPUNCTURE: CPT

## 2024-06-03 PROCEDURE — 83001 ASSAY OF GONADOTROPIN (FSH): CPT

## 2024-06-03 PROCEDURE — 82570 ASSAY OF URINE CREATININE: CPT

## 2024-06-03 PROCEDURE — 84403 ASSAY OF TOTAL TESTOSTERONE: CPT

## 2024-06-03 PROCEDURE — 84443 ASSAY THYROID STIM HORMONE: CPT

## 2024-06-03 RX ORDER — BROMPHENIRAMINE MALEATE, PSEUDOEPHEDRINE HYDROCHLORIDE, AND DEXTROMETHORPHAN HYDROBROMIDE 2; 30; 10 MG/5ML; MG/5ML; MG/5ML
5 SYRUP ORAL 4 TIMES DAILY PRN
Qty: 120 ML | Refills: 0 | Status: SHIPPED | OUTPATIENT
Start: 2024-06-03

## 2024-06-03 RX ORDER — BUPROPION HYDROCHLORIDE 150 MG/1
150 TABLET ORAL EVERY MORNING
Qty: 90 TABLET | Refills: 1 | Status: SHIPPED | OUTPATIENT
Start: 2024-06-03

## 2024-06-03 RX ORDER — BENZONATATE 200 MG/1
200 CAPSULE ORAL 3 TIMES DAILY PRN
Qty: 30 CAPSULE | Refills: 0 | Status: SHIPPED | OUTPATIENT
Start: 2024-06-03

## 2024-06-03 RX ORDER — AZITHROMYCIN 250 MG/1
TABLET, FILM COATED ORAL
Qty: 6 TABLET | Refills: 0 | Status: SHIPPED | OUTPATIENT
Start: 2024-06-03 | End: 2024-06-08

## 2024-06-03 RX ORDER — ESCITALOPRAM OXALATE 20 MG/1
20 TABLET ORAL DAILY
Qty: 90 TABLET | Refills: 1 | Status: SHIPPED | OUTPATIENT
Start: 2024-06-03

## 2024-06-03 RX ORDER — OMEPRAZOLE 40 MG/1
40 CAPSULE, DELAYED RELEASE ORAL DAILY
Qty: 90 CAPSULE | Refills: 1 | Status: SHIPPED | OUTPATIENT
Start: 2024-06-03

## 2024-06-03 RX ORDER — BECLOMETHASONE DIPROPIONATE HFA 80 UG/1
2 AEROSOL, METERED RESPIRATORY (INHALATION) 2 TIMES DAILY
Qty: 10.6 G | Refills: 2 | Status: SHIPPED | OUTPATIENT
Start: 2024-06-03

## 2024-06-03 RX ORDER — VALSARTAN 80 MG/1
80 TABLET ORAL DAILY
Qty: 90 TABLET | Refills: 1 | Status: SHIPPED | OUTPATIENT
Start: 2024-06-03

## 2024-06-03 RX ORDER — METHYLPREDNISOLONE 4 MG/1
TABLET ORAL
Qty: 21 EACH | Refills: 0 | Status: SHIPPED | OUTPATIENT
Start: 2024-06-03

## 2024-06-03 NOTE — LETTER
Debo 3, 2024     Patient: Maggie Iraheta  YOB: 1980  Date of Visit: 6/3/2024      To Whom it May Concern:    Maggie Iraheta is under my professional care. For medical reasons, patient is unable to wear an N-95 mask.     If you have any questions or concerns, please don't hesitate to call.         Sincerely,          Angela Tran MD

## 2024-06-03 NOTE — PROGRESS NOTES
Assessment/Plan:    No problem-specific Assessment & Plan notes found for this encounter.      Return in about 3 months (around 9/3/2024) for Next scheduled follow up.      There are no Patient Instructions on file for this visit.    Diagnoses and all orders for this visit:    Encounter to establish care    Primary hypertension  -     valsartan (DIOVAN) 80 mg tablet; Take 1 tablet (80 mg total) by mouth daily  -     CBC and Platelet; Future  -     Comprehensive metabolic panel; Future  -     Albumin / creatinine urine ratio; Future    Vocal cord paralysis, bilateral complete  -     Ambulatory Referral to Otolaryngology; Future    Laryngeal stenosis  -     Ambulatory Referral to Otolaryngology; Future    Depression with anxiety  -     buPROPion (WELLBUTRIN XL) 150 mg 24 hr tablet; Take 1 tablet (150 mg total) by mouth every morning  -     escitalopram (LEXAPRO) 20 mg tablet; Take 1 tablet (20 mg total) by mouth daily    Uncomplicated asthma, unspecified asthma severity, unspecified whether persistent  -     beclomethasone (Qvar RediHaler) 80 MCG/ACT inhaler; Inhale 2 puffs 2 (two) times a day    Dyslipidemia  -     Lipid Panel with Direct LDL reflex; Future    Gastroesophageal reflux disease without esophagitis  -     omeprazole (PriLOSEC) 40 MG capsule; Take 1 capsule (40 mg total) by mouth daily    DELFINA (obstructive sleep apnea)  -     Ambulatory Referral to Sleep Medicine; Future    Class 1 obesity with serious comorbidity and body mass index (BMI) of 33.0 to 33.9 in adult, unspecified obesity type  -     Hemoglobin A1C; Future    Upper respiratory tract infection, unspecified type  -     methylPREDNISolone 4 MG tablet therapy pack; Use as directed on package  -     azithromycin (Zithromax) 250 mg tablet; Take 2 tablets (500 mg total) by mouth daily for 1 day, THEN 1 tablet (250 mg total) daily for 4 days.  -     benzonatate (TESSALON) 200 MG capsule; Take 1 capsule (200 mg total) by mouth 3 (three) times a day as  needed for cough  -     brompheniramine-pseudoephedrine-DM 30-2-10 MG/5ML syrup; Take 5 mL by mouth 4 (four) times a day as needed for cough or congestion        - Blood pressure well controlled; continue current regimen of Valsartan 80mg daily. CBC, CMP and urine microalbumin/creatinine ratio ordered to assess kidney function   - Referral to ENT placed to [facility] care within the [facility]  - Mood stable on Wellbutrin and Lexapro; continue therapy sessions  - Repeat lipid panel ordered due to dyslipidemia   - The USPSTF recommends screening for prediabetes and type 2 diabetes in adults aged 35 to 70 years who are overweight or obese. Hemoglobin A1C ordered   - Continue Omeprazole for reflux  - Continue Qvar inhaler; patient enquiring about seeing pulmonologist but would prefer to see ENT first  - Referral to sleep medicine placed due to history of DELFINA  - Declines covid testing; recommend bromfed DM, tessalon perles and medrol dose pack. Pocket prescription for Azithromycin provided should symptoms fail to resolve or worsen. ED precautions reviewed with patient  - Follow up in 3 months or as needed    Subjective:       BETY  Maggie Iraheta is a very pleasant 43 year old female with a past medical history of laryngeal stenosis, hypertension, asthma, depression with anxiety, DELFINA, GERD and dyslipidemia  who presents today for an encounter to establish care. Patient was born a micropreemie weighting 1lbs 11oz. 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 She also subsequently had laryngeal stenosis and has been following with ENT. Her last tracheostomy was in 2007. She had been following with Dr Lorenzana at [facility] but has not been seen physically in the office since 2020. She is looking to establish care with Dr Allan and is requesting a referral. She takes Qvar inhaler PRN however has not been officially diagnosed as having asthma and has not had pulmonary function testing. She  has been sick with URI symptoms namely cough, congestion and chest tightness for the past 2 days and took one dose of dayquil which made her sick. She has not taken anything else since that time.She states that she usually gets a course of azithromycin and medrol dose pack is symptoms start to worsen. She does have a history of depression and anxiety which is stable on her current medication regimen of wellbutrin and lexapro. She is also seeing a therapist. She reports a history of DELFINA but has never been on CPAP. She would like to re-establish care with sleep medicine to discuss other treatment options.       Current Outpatient Medications on File Prior to Visit   Medication Sig Dispense Refill    Cholecalciferol 25 MCG (1000 UT) tablet Take 1,000 Units by mouth daily      loratadine (CLARITIN) 10 mg tablet       Multiple Vitamins-Minerals (Womens Multivitamin) TABS       [DISCONTINUED] beclomethasone (Qvar RediHaler) 80 MCG/ACT inhaler       [DISCONTINUED] buPROPion (WELLBUTRIN XL) 150 mg 24 hr tablet Take 150 mg by mouth daily      [DISCONTINUED] escitalopram (LEXAPRO) 20 mg tablet       [DISCONTINUED] omeprazole (PriLOSEC) 40 MG capsule       [DISCONTINUED] valsartan (DIOVAN) 80 mg tablet        No current facility-administered medications on file prior to visit.     Past Medical History:   Diagnosis Date    Anxiety     Asthma 1982    Bi Lateral Vocal Cord Paralysis with Subglottic Stenosis    Cancer (HCC) 2010    Thyroid maternal grandmother Myriam    Cerebral hemorrhage (HCC)     Depression     Disorder of airway     Fibroadenoma of right breast     Fibroid 2009    Mother, Maryjane Hysterectomy    GERD (gastroesophageal reflux disease)     Heart disease     Paternal grandfather    History of transfusion [id number]    205 blood transfusions as an infant, no issues currently, difficult stick    Hypertension     Father    Inflammation of subglottic region     Kidney stone 1990’s, 2010    Mother [facility] Sister Nicole     Polycystic ovary syndrome 2012    Sister Nicole    Pulmonary embolism (HCC)     Mother Maryjane Post Covid    Sinus problem     Skin rash     Sleep apnea     Stroke (HCC) Birth, 2019    Myself at birth resolved, 2019 Father resolved    TIA (transient ischemic attack)     Vocal cord paralysis      Past Surgical History:   Procedure Laterality Date    BREAST BIOPSY Right 05/15/2017    2 sites-U/S BX - both benign     SECTION  , ,     3 siblings    TRACHEOSTOMY      US GUIDANCE BREAST BIOPSY RIGHT EACH ADDITIONAL Right 05/15/2017    US GUIDED BREAST BIOPSY RIGHT COMPLETE Right 05/15/2017     Social History     Socioeconomic History    Marital status: Single     Spouse name: None    Number of children: None    Years of education: None    Highest education level: None   Occupational History    None   Tobacco Use    Smoking status: Never    Smokeless tobacco: Never   Vaping Use    Vaping status: Never Used   Substance and Sexual Activity    Alcohol use: Yes     Alcohol/week: 1.0 standard drink of alcohol     Types: 1 Glasses of wine per week    Drug use: Never    Sexual activity: Yes     Birth control/protection: None     Comment: Just solo masturbation with toys and fingers   Other Topics Concern    None   Social History Narrative    None     Social Determinants of Health     Financial Resource Strain: Not on file   Food Insecurity: Not on file   Transportation Needs: Not on file   Physical Activity: Not on file   Stress: Not on file   Social Connections: Not on file   Intimate Partner Violence: Not on file   Housing Stability: Not on file     Family History   Problem Relation Age of Onset    Thyroid cancer Maternal Grandmother         age dx unk    Cancer Maternal Grandmother         Thyroid    Prostate cancer Paternal Grandfather         age dx unk    Thyroid cancer Paternal Grandfather         age dx unk    Cancer Paternal Grandfather         Thyroid    Heart failure Paternal Grandfather     Brain  "cancer Cousin         age dx unk    Deep vein thrombosis Mother         Post Covid    Pulmonary embolism Mother         Post Covid    Diabetes Mother     Diabetes Father     Heart disease Father     Stroke Father         2019 resolved    Miscarriages / Stillbirths Sister         1    No Known Problems Maternal Grandfather     No Known Problems Paternal Grandmother     No Known Problems Sister     No Known Problems Brother     No Known Problems Brother     No Known Problems Paternal Aunt     No Known Problems Other     Breast cancer Cousin      Past Surgical History:   Procedure Laterality Date    BREAST BIOPSY Right 05/15/2017    2 sites-U/S BX - both benign     SECTION  , ,     3 siblings    TRACHEOSTOMY      US GUIDANCE BREAST BIOPSY RIGHT EACH ADDITIONAL Right 05/15/2017    US GUIDED BREAST BIOPSY RIGHT COMPLETE Right 05/15/2017        Review of Systems   Constitutional: Negative.    HENT:  Positive for congestion.    Eyes: Negative.    Respiratory:  Positive for cough and chest tightness.    Cardiovascular: Negative.    Gastrointestinal: Negative.    Genitourinary: Negative.    Musculoskeletal: Negative.    Skin: Negative.    Neurological: Negative.    Psychiatric/Behavioral: Negative.         Objective:    /82 (BP Location: Left arm, Patient Position: Sitting, Cuff Size: Adult)   Pulse 78   Temp (!) 97 °F (36.1 °C) (Temporal)   Ht 5' 1\" (1.549 m)   Wt 80.6 kg (177 lb 9.6 oz)   SpO2 99%   BMI 33.56 kg/m²     Physical Exam  Constitutional:       General: She is not in acute distress.     Appearance: She is not ill-appearing.   HENT:      Head: Normocephalic and atraumatic.      Mouth/Throat:      Comments: Raspy voice  Eyes:      General:         Right eye: No discharge.         Left eye: No discharge.      Extraocular Movements: Extraocular movements intact.   Cardiovascular:      Rate and Rhythm: Normal rate.   Pulmonary:      Effort: Pulmonary effort is normal. No respiratory " distress.      Breath sounds: Wheezing present.   Abdominal:      General: Bowel sounds are normal. There is no distension.      Palpations: Abdomen is soft.      Tenderness: There is no abdominal tenderness.   Neurological:      General: No focal deficit present.      Mental Status: She is alert.   Psychiatric:         Mood and Affect: Mood normal.         Behavior: Behavior normal.         Angela Tran MD  06/03/24  10:36 AM

## 2024-06-04 LAB
DHEA-S SERPL-MCNC: 337 UG/DL (ref 57.3–279.2)
TESTOST FREE SERPL-MCNC: 1.7 PG/ML (ref 0–4.2)
TESTOST SERPL-MCNC: 57 NG/DL (ref 4–50)

## 2024-06-13 ENCOUNTER — TELEPHONE (OUTPATIENT)
Age: 44
End: 2024-06-13

## 2024-06-13 NOTE — TELEPHONE ENCOUNTER
----- Message from Talya Murray MD sent at 6/13/2024  3:05 PM EDT -----  Please call Maggie and advise results of labs were reviewed. Some values are slightly elevated, which can indicate PCOS, however, this is not diagnostic. I would recommend getting the ultrasound that was ordered.   Talya Murray MD

## 2024-06-13 NOTE — TELEPHONE ENCOUNTER
Per comm consent lm to pt with results and recommendations of labs from Dr Murray.     Advised to call and schedule the US and gave scheduling #-    If patient calls back and results or message was reviewed/appointment made if needed- please close out result note. Thank you

## 2024-06-16 ENCOUNTER — HOSPITAL ENCOUNTER (OUTPATIENT)
Dept: ULTRASOUND IMAGING | Facility: HOSPITAL | Age: 44
Discharge: HOME | End: 2024-06-16
Payer: COMMERCIAL

## 2024-06-16 DIAGNOSIS — N92.6 IRREGULAR MENSES: ICD-10-CM

## 2024-06-16 PROCEDURE — 76830 TRANSVAGINAL US NON-OB: CPT

## 2024-06-16 PROCEDURE — 76856 US EXAM PELVIC COMPLETE: CPT

## 2024-09-05 ENCOUNTER — OFFICE VISIT (OUTPATIENT)
Dept: FAMILY MEDICINE CLINIC | Facility: CLINIC | Age: 44
End: 2024-09-05
Payer: COMMERCIAL

## 2024-09-05 ENCOUNTER — TELEPHONE (OUTPATIENT)
Dept: FAMILY MEDICINE CLINIC | Facility: CLINIC | Age: 44
End: 2024-09-05

## 2024-09-05 VITALS
OXYGEN SATURATION: 98 % | SYSTOLIC BLOOD PRESSURE: 122 MMHG | DIASTOLIC BLOOD PRESSURE: 82 MMHG | HEIGHT: 61 IN | HEART RATE: 73 BPM | BODY MASS INDEX: 33.3 KG/M2 | TEMPERATURE: 97.7 F | WEIGHT: 176.4 LBS

## 2024-09-05 DIAGNOSIS — J38.02 VOCAL CORD PARALYSIS, BILATERAL COMPLETE: ICD-10-CM

## 2024-09-05 DIAGNOSIS — F41.8 DEPRESSION WITH ANXIETY: ICD-10-CM

## 2024-09-05 DIAGNOSIS — I10 PRIMARY HYPERTENSION: Primary | ICD-10-CM

## 2024-09-05 DIAGNOSIS — J45.40 MODERATE PERSISTENT ASTHMA WITHOUT COMPLICATION: ICD-10-CM

## 2024-09-05 DIAGNOSIS — J38.6 LARYNGEAL STENOSIS: ICD-10-CM

## 2024-09-05 PROCEDURE — 99214 OFFICE O/P EST MOD 30 MIN: CPT | Performed by: FAMILY MEDICINE

## 2024-09-05 PROCEDURE — 3725F SCREEN DEPRESSION PERFORMED: CPT | Performed by: FAMILY MEDICINE

## 2024-09-05 RX ORDER — ALBUTEROL SULFATE 90 UG/1
2 AEROSOL, METERED RESPIRATORY (INHALATION) EVERY 6 HOURS PRN
Qty: 18 G | Refills: 5 | Status: SHIPPED | OUTPATIENT
Start: 2024-09-05

## 2024-09-05 RX ORDER — BIOTIN 1 MG
TABLET ORAL
COMMUNITY
Start: 2024-08-19

## 2024-09-05 NOTE — TELEPHONE ENCOUNTER
I sent a my chart message to the patient to advise that the ent referral is in the system for her.

## 2024-09-05 NOTE — PATIENT INSTRUCTIONS
"Dr Chary Rondon       Patient Education     Hormonal birth control   The Basics   Written by the doctors and editors at [facility]   What is hormonal birth control? -- This is any pill, injection, device, or treatment that uses hormones to prevent pregnancy. There are a few different kinds of hormonal birth control. Some contain the hormones estrogen and progestin. Others contain only progestin.  While no birth control works 100 percent perfectly all of the time, hormonal methods work very well to prevent pregnancy. The methods differ in how easy they are to use and their side effects (table 1):   Pills - If you choose birth control pills, you need to take a pill every day. Skipping pills can increase the chance of getting pregnant. Birth control pill packets usually include 4 to 7 days of hormone-free pills each month. You get your period during these hormone-free days. If you prefer not to get a period, you can skip the hormone-free pills and take a hormone pill every day instead. This is called \"continuous dosing.\"  Most birth control pills contain estrogen and progestin, but some contain only progestin. One progestin-only pill (brand name: Opill) is available without a prescription in the .   Skin patches - There are a few different patches available (brand names: Xulane, Xafemy, Twirla). You can wear the patch on your shoulder, back, belly, or hip (figure 1). Some can also be worn on the upper arm. You change the patch once a week, and you put it in a new place each time. You typically wear a new patch each week for 3 weeks and then leave the patch off during week 4. You get your period during week 4. Skin patches for birth control contain both estrogen and progestin.   Vaginal rings - This is a flexible ring you put in your vagina (sample brand names: Annovera, NuvaRing) (figure 2). The ring releases the hormones estrogen and progestin.  Do not remove the ring when you have sex. You " "need to check before and after sex to make sure that it is in place. If the ring comes out, make sure that you know how quickly you need to put it back in. This depends on which brand of ring you have. In general, if it comes out for more than a few hours, you need to use another form of birth control to prevent pregnancy.  You typically keep the ring in for 3 weeks. Then, depending on which ring you have, you either throw it away or clean it and store it to put back in later. You do not use the ring during week 4, which is when you have your period.  You can choose to continue using a ring for longer than 3 weeks. If so, you will not have a regular period, although you might have some light bleeding or \"spotting.\"   Injections - If you use hormone injections, you will get a shot in the arm or buttocks every 3 months. Injections for birth control (brand name: Depo-Provera) contain only progestin.   Implants - A birth control implant is a tiny marlen that releases hormones in the arm (figure 3). It must be implanted by a doctor or nurse and can stay in the arm for up to 3 years. Implants for birth control (brand name: Nexplanon) contain only progestin.   Hormone-containing intrauterine device (\"IUD\") - This device is placed inside the uterus to prevent pregnancy (figure 4). Some IUDs work by releasing hormones into the body (brand names: Mirena, Liletta, Kyleena, Naomi). Depending on which hormone-releasing IUD you get and your age, it can stay in place for 3 to 8 years. The hormone-releasing IUDs contain the hormone levonorgestrel, which is a progestin.  Hormonal birth control is a safe and reliable way to prevent pregnancy for most people. But it does not protect you from infections that spread through sex (called \"sexually transmitted infections\" or \"sexually transmitted diseases\").  Why else might a person use hormonal birth control? -- Hormonal birth control has other benefits besides preventing pregnancy. It can " "make your periods lighter or more regular. For this reason, it is also often used in the treatment of certain health conditions, including:   Heavy, irregular, or painful periods - Different things can affect your monthly period. Some people also get painful cramps or other symptoms.   Polycystic ovary syndrome (\"PCOS\") - This condition can cause irregular periods, acne, extra facial hair, or hair loss from the head.   Menstrual migraines - These are migraine headaches that are triggered by hormone changes around the monthly period. Hormonal birth control might be an option for treatment, as long as you do not get migraines with an \"aura.\" An aura is a symptom or feeling that happens before or during the headache. For example, some people see flashing lights, bright spots, or zig-zag lines, or lose part of their vision.  If you have any of these conditions, your doctor or nurse might suggest the pill or another form of hormonal birth control. Do not try using birth control to treat a health condition without talking to your doctor or nurse first.  How do I choose the right hormonal birth control for me? -- Work with your doctor or nurse to choose the best option for you. Think about how likely you are to use each method the right way. Can you remember to take a pill every day? Can you remember to change a patch once a week? Long-acting methods (IUD, implant) are the most convenient because they work for 3 to 10 years, depending on the method. The injection, which works for 3 months, might be more convenient than the pill, patch, or ring. Also, ask your doctor how the method you are thinking about will affect your period. The table has a list of side effects and risks for each of the different forms (table 1).  Is hormonal birth control safe for everyone? -- No. Some people should not use estrogen-containing hormonal birth control. This includes those who:   Are age 35 or older and smoke cigarettes - These things " "increase your risk for heart attacks and strokes.   Could possibly be pregnant - Before prescribing hormonal birth control, your doctor or nurse will ask questions to make sure that you are not pregnant. You might need to take a pregnancy test to confirm this.   Have had blood clots or a stroke in the past   Are being treated for breast cancer, or have had breast cancer before   Have some types of liver disease - Hormonal birth control can make some types of liver disease worse.   Have some types of heart disease   Get the type of migraine headaches that cause vision or hearing problems  If you have high blood pressure, you can still use hormonal birth control. But your blood pressure needs to be well controlled and regularly checked by a doctor.  Many people who can't take estrogen-containing hormonal birth control can take other kinds of hormonal birth control that contain only progestin. Or they can use methods that do not contain hormones.  What if I take medicines besides birth control? -- Some medicines can affect how well hormonal birth control works. These include:   Some medicines used to prevent seizures (called \"anticonvulsants\")   Certain antibiotics used to treat tuberculosis (rifampin and rifabutin)   Darcy's Wort (an herbal medicine for depression)  If you take any of these medicines, talk to your doctor about how to handle birth control. Also, if you already take hormonal birth control, tell any doctor or nurse who might be prescribing medicines for you.  What if I forget to use my hormonal birth control? -- If you have sex and forgot to use your birth control, you can take emergency contraception to reduce your risk of pregnancy. Some forms of emergency contraception require a prescription, but you can buy others in a pharmacy. The IUD is the most effective method of emergency contraception, but requires a doctor or nurse to insert it. If you need to use emergency contraception, do it as soon as " possible after sex.  All topics are updated as new evidence becomes available and our peer review process is complete.  This topic retrieved from [facility] on: Mar 20, 2024.  Topic 72726 Version 21.0  Release: 32.2.4 - C32.78  © 2024 UpToDate, Inc. and/or its affiliates. All rights reserved.  table 1: Hormonal birth control  Method  Using the method  Some side effects and risks    Implantable marlen   Hormones it contains: Progestin  Expected pregnancies per 100 people in first year of use: Less than 1 Must be inserted by a doctor.  Nothing to do or remember.  Lasts up to 3 years. Most common side effects:   Bleeding between periods  Changes in periods  Other possible side effects:  Headache  Acne  Sore breasts  Weight gain  Mood changes   IUD with progestin   Hormones it contains: Progestin  Expected pregnancies per 100 people in first year of use: Less than 1 Must be inserted by a doctor.  Nothing to do or remember.  Lasts 3 to 8 years depending on type. Most common side effects:   Bleeding between periods  Regular periods may stop  Other possible side effects:  Cramps  Potential but uncommon risks:  IUD can slip out  IUD can damage the uterus  Insertion of IUD can lead to a type of infection that can make it hard to get pregnant after the IUD is removed   Shot/injection   Hormones it contains: Progestin  Expected pregnancies per 100 people in first year of use: 4 to 7 Doctor or nurse must give you a shot every 3 months. Most common side effects:   Bleeding between periods  Regular periods may stop  Other possible side effects:  Temporary bone thinning  Weight gain  Mood changes  Hair loss or increased hair on face or body  Sore breasts  Headache   Progestin-only pill   Hormones it contains: Progestin  Expected pregnancies per 100 people in first year of use: 4 to 7 You must swallow a pill at the same time every day. Most common side effects:   Bleeding between periods (this is more common with progestin-only pills  than with combined estrogen-progestin pills)  Other possible side effects:  Sore breasts  Acne   Combination estrogen-progestin pill   Hormones it contains: Progestin and estrogen  Expected pregnancies per 100 people in first year of use: 4 to 7 You must swallow a pill every day. Most common side effects:   Bleeding between periods  Decreased bleeding during period  Other possible side effects:  Nausea  Changes in mood  Rare but serious risks include:  Heart attack  Stroke  Blood clots  High blood pressure  Liver tumors  Gallstones  Yellowing of the skin or eyes (jaundice)   Patch   Hormones it contains: Progestin and estrogen  Expected pregnancies per 100 people in first year of use: 4 to 7 You must wear a patch on your skin all of the time for 3 weeks. Every week, you change the patch. During the 4th week, you do not use a patch. Side effects and risks are similar to those of combined estrogen-progestin pills, but the patch causes higher average levels of estrogen than most pills. This may increase the risk of blood clots.  Other possible side effects:  Skin irritation where the patch is applied   Vaginal ring   Hormones it contains: Progestin and estrogen  Expected pregnancies per 100 people in first year of use: 4 to 7 You must put the ring into your vagina and leave it in for 3 weeks. During the 4th week, you do not use a ring. Side effects and risks are similar to those of the combined estrogen-progestin pill. (Breast soreness and nausea may be less than with the pill.)  Other possible side effects:  Vaginal discharge or irritation   The methods listed here all require a prescription. This table applies to people without medical problems, such as a history of cancer, blood clots, or liver disease. If you have any medical problems, ask your doctor or nurse whether you should avoid any type of hormonal birth control. If you are breastfeeding, tell your doctor or nurse when you choose birth control.  IUD:  intrauterine device.  Graphic 23601 Version 8.0  figure 1: Birth control skin patch     This picture shows a birth control patch on the upper back. You can wear the patch on your shoulder, back, belly, or hip. One type of patch can also be worn on the upper arm.  The skin patch delivers hormones into the body that help prevent pregnancy.  Graphic 36665 Version 9.0  figure 2: Vaginal ring     This picture shows the birth control ring. It is a flexible ring that you put in your vagina for 3 weeks at a time. It delivers hormones into the body that help prevent pregnancy.  Graphic 61026 Version 5.0  figure 3: Birth control implant     This picture shows the shape and size of the birth control implant. It is implanted into the upper arm, where it releases hormones that help prevent pregnancy.  Graphic 50074 Version 5.0  figure 4: IUDs     This picture shows 2 types of IUDs. There are different IUDs available. They are placed inside the uterus to help prevent pregnancy. Some hormonal IUDs can help reduce menstrual bleeding. The copper IUD can actually make menstrual bleeding heavier.  Graphic 87383 Version 15.0  Consumer Information Use and Disclaimer   Disclaimer: This generalized information is a limited summary of diagnosis, treatment, and/or medication information. It is not meant to be comprehensive and should be used as a tool to help the user understand and/or assess potential diagnostic and treatment options. It does NOT include all information about conditions, treatments, medications, side effects, or risks that may apply to a specific patient. It is not intended to be medical advice or a substitute for the medical advice, diagnosis, or treatment of a health care provider based on the health care provider's examination and assessment of a patient's specific and unique circumstances. Patients must speak with a health care provider for complete information about their health, medical questions, and treatment  options, including any risks or benefits regarding use of medications. This information does not endorse any treatments or medications as safe, effective, or approved for treating a specific patient. UpToDate, Inc. and its affiliates disclaim any warranty or liability relating to this information or the use thereof.The use of this information is governed by the Terms of Use, available at https://www.[facility].com/en/know/clinical-effectiveness-terms. 2024© UpToDate, Inc. and its affiliates and/or licensors. All rights reserved.  Copyright   © 2024 UpToDate, Inc. and/or its affiliates. All rights reserved.

## 2024-09-05 NOTE — PROGRESS NOTES
Ambulatory Visit  Name: Maggie Iraheta      : 1980      MRN: [id number]  Encounter Provider: Angela Tran MD  Encounter Date: 2024   Encounter department: [facility]    Assessment & Plan   1. Primary hypertension  2. Moderate persistent asthma without complication  -     Ambulatory Referral to Pulmonology; Future  -     albuterol (Ventolin HFA) 90 mcg/act inhaler; Inhale 2 puffs every 6 (six) hours as needed for wheezing  3. Depression with anxiety  4. Vocal cord paralysis, bilateral complete  -     Ambulatory Referral to Pulmonology; Future  5. Laryngeal stenosis  -     Ambulatory Referral to Pulmonology; Future  - Blood pressure well controlled; continue current regimen of Valsartan 80mg daily.   - Patient advised to call ENT to set up appointment due to laryngeal stenosis and vocal cord paralysis   - Mood stable on Wellbutrin and Lexapro; continue therapy sessions  - Continue Qvar inhaler 2 puff BID; patient also enquiring about pulmonology referral as well  - Patient advised to make appointment with sleep medicine due to DELFINA     History of Present Illness     Maggie Iraheta is a very pleasant 43 year old female with a past medical history of laryngeal stenosis, hypertension, asthma, depression with anxiety, DELFINA, GERD and dyslipidemia who presents today for a follow up. Overall she feels well. She was referred to ENT and sleep medicine at previous office visit but has yet to make those appointments. She remains compliant with her medications and she is also following with a therapist as well for her depression and anxiety.     Review of Systems   Constitutional: Negative.    HENT:  Positive for voice change.    Eyes: Negative.    Respiratory: Negative.     Cardiovascular: Negative.    Gastrointestinal: Negative.    Genitourinary: Negative.    Musculoskeletal: Negative.    Skin: Negative.    Neurological: Negative.    Psychiatric/Behavioral: Negative.       Current Outpatient  "Medications on File Prior to Visit   Medication Sig Dispense Refill    beclomethasone (Qvar RediHaler) 80 MCG/ACT inhaler Inhale 2 puffs 2 (two) times a day 10.6 g 2    Biotin 1000 MCG tablet       buPROPion (WELLBUTRIN XL) 150 mg 24 hr tablet Take 1 tablet (150 mg total) by mouth every morning 90 tablet 1    Cholecalciferol 25 MCG (1000 UT) tablet Take 1,000 Units by mouth daily      escitalopram (LEXAPRO) 20 mg tablet Take 1 tablet (20 mg total) by mouth daily 90 tablet 1    loratadine (CLARITIN) 10 mg tablet       Multiple Vitamins-Minerals (Womens Multivitamin) TABS       omeprazole (PriLOSEC) 40 MG capsule Take 1 capsule (40 mg total) by mouth daily 90 capsule 1    valsartan (DIOVAN) 80 mg tablet Take 1 tablet (80 mg total) by mouth daily 90 tablet 1    methylPREDNISolone 4 MG tablet therapy pack Use as directed on package (Patient not taking: Reported on 9/5/2024) 21 each 0    [DISCONTINUED] benzonatate (TESSALON) 200 MG capsule Take 1 capsule (200 mg total) by mouth 3 (three) times a day as needed for cough (Patient not taking: Reported on 9/5/2024) 30 capsule 0    [DISCONTINUED] brompheniramine-pseudoephedrine-DM 30-2-10 MG/5ML syrup Take 5 mL by mouth 4 (four) times a day as needed for cough or congestion (Patient not taking: Reported on 9/5/2024) 120 mL 0     No current facility-administered medications on file prior to visit.      Social History     Tobacco Use    Smoking status: Never    Smokeless tobacco: Never   Vaping Use    Vaping status: Never Used   Substance and Sexual Activity    Alcohol use: Yes     Alcohol/week: 1.0 standard drink of alcohol     Types: 1 Glasses of wine per week    Drug use: Never    Sexual activity: Yes     Birth control/protection: None     Comment: Just solo masturbation with toys and fingers     Objective     /82 (BP Location: Left arm, Patient Position: Sitting, Cuff Size: Standard)   Pulse 73   Temp 97.7 °F (36.5 °C) (Temporal)   Ht 5' 1\" (1.549 m)   Wt 80 kg " (176 lb 6.4 oz)   SpO2 98%   BMI 33.33 kg/m²     Physical Exam  Constitutional:       General: She is not in acute distress.     Appearance: She is not ill-appearing.      Comments: Raspy voice   HENT:      Head: Normocephalic and atraumatic.   Eyes:      General:         Right eye: No discharge.         Left eye: No discharge.      Extraocular Movements: Extraocular movements intact.      Pupils: Pupils are equal, round, and reactive to light.   Cardiovascular:      Rate and Rhythm: Normal rate.   Pulmonary:      Effort: Pulmonary effort is normal. No respiratory distress.      Breath sounds: Wheezing present.   Abdominal:      General: There is no distension.      Palpations: Abdomen is soft.      Tenderness: There is no abdominal tenderness.   Musculoskeletal:      Right lower leg: No edema.      Left lower leg: No edema.   Neurological:      General: No focal deficit present.      Mental Status: She is alert.   Psychiatric:         Mood and Affect: Mood normal.         Behavior: Behavior normal.       Administrative Statements         Depression Screening Follow-up Plan: Patient's depression screening was positive with a PHQ-2 score of . Their PHQ-9 score was 6. Patient assessed for underlying major depression. They have no active suicidal ideations. Brief counseling provided and recommend additional follow-up/re-evaluation next office visit.

## 2024-09-09 ENCOUNTER — OFFICE VISIT (OUTPATIENT)
Dept: SLEEP CENTER | Facility: CLINIC | Age: 44
End: 2024-09-09
Payer: COMMERCIAL

## 2024-09-09 VITALS
DIASTOLIC BLOOD PRESSURE: 80 MMHG | HEIGHT: 61 IN | OXYGEN SATURATION: 98 % | WEIGHT: 176 LBS | RESPIRATION RATE: 16 BRPM | BODY MASS INDEX: 33.23 KG/M2 | SYSTOLIC BLOOD PRESSURE: 114 MMHG | HEART RATE: 90 BPM

## 2024-09-09 DIAGNOSIS — J38.6 LARYNGEAL STENOSIS: ICD-10-CM

## 2024-09-09 DIAGNOSIS — G47.33 OSA (OBSTRUCTIVE SLEEP APNEA): ICD-10-CM

## 2024-09-09 DIAGNOSIS — R06.83 SNORING: Primary | ICD-10-CM

## 2024-09-09 DIAGNOSIS — E66.09 CLASS 1 OBESITY DUE TO EXCESS CALORIES WITH SERIOUS COMORBIDITY AND BODY MASS INDEX (BMI) OF 33.0 TO 33.9 IN ADULT: ICD-10-CM

## 2024-09-09 DIAGNOSIS — Z98.890 HISTORY OF TRACHEOSTOMY: ICD-10-CM

## 2024-09-09 DIAGNOSIS — G47.19 EXCESSIVE DAYTIME SLEEPINESS: ICD-10-CM

## 2024-09-09 DIAGNOSIS — R06.81 WITNESSED EPISODE OF APNEA: ICD-10-CM

## 2024-09-09 PROCEDURE — 99203 OFFICE O/P NEW LOW 30 MIN: CPT

## 2024-09-09 NOTE — PROGRESS NOTES
[facility]  Sleep Medicine New Patient Evaluation    PATIENT NAME: Maggie Iraheta  DATE OF SERVICE: September 9, 2024    CONSULTING PROVIDER:  Angela Tran MD  [address]  [address]  [place] [address]    ASSESSMENT/PLAN:  Maggie Iraheta is a 43 y.o. female with PMHx of DELFINA not on therapy, HTN, laryngeal stenosis h/o tracheostomy, GERD, anxiety, depression and obesity who presents for sleep evaluation.    History of DELFINA (Obstructive Sleep Apnea)  Snoring  Witnessed Episode of Apnea  Excessive Daytime Sleepiness  Laryngeal Stenosis  H/O Tracheostomy  - The patient reports a history of sleep apnea last studied roughly 10 years ago on PSG although results are not currently available and severity is unknown.  She states in the past she has been recommended by ENT not to use PAP therapy, and she is planning on establishing with a local ENT.  She does have a fairly significant overbite/mild retrognathia, and MAD + positional therapy may be an additional option if ENT feels PAP therapy is contraindicated.  - She does have stridor and notes this is chronic and at baseline, she is hoping to avoid repeat tracheostomy.  - Will obtain PSG, but hold off on initiation of PAP therapy if history of sleep apnea until evaluated by ENT.  - Discussed with the patient the possible diagnosis, causes and conditions associated with SDB along with potential treatments.  The patient is amenable to discussing results/treatment plan via phone/MyChart.  - Encouraged healthy lifestyle with adequate sleep (7-9 hours per night), healthy balanced diet and routine exercise.  - Follow-up to be determined based on results sleep study.  -     Ambulatory Referral to Sleep Medicine  -     Diagnostic Sleep Study; Future    Class 1 Obesity  - Weight loss is recommended, the patient is currently on a keto diet.    Thank you for allowing me to participate in the care of your patient.  If there are any questions  regarding evaluation please feel free to reach out.   ________________________________________________________________________________________________    HPI: Maggie Iraheta is a 43 y.o. female with PMHx of DELFINA not on therapy, HTN, laryngeal stenosis h/o tracheostomy, GERD, anxiety, depression and obesity who presents for sleep evaluation.  Sleep-Related History: She thinks she had a PSG ~10 years ago that did show sleep apnea, but she's unsure about severity.  She has never been on PAP therapy.    The patient was referred by her PCP due to history of sleep apnea not currently on any therapy.  She reports that she had a sleep study roughly 10 years ago that showed sleep apnea of unknown severity, but was never on any treatments.  She states that her ENT at [place] had recommended not using PAP therapy due to the complexity of her airway.  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  She previously had a tracheostomy roughly 10 years ago.  She does note loud snoring, witnessed apneas, gasping/choking in sleep and daytime sleepiness.  She also has morning headaches at times and wakes with dry mouth often.  Her smart watch has alerted her in the past that her oxygen drops to the 80s with sleep.  Currently she denies symptoms consistent with SP, HH, cataplexy, RLS, insomnia or parasomnias.    ESS: Total score: 16/24  Greater or equal to 10 is positive for excessive daytime sleepiness  Pertinent Meds: None    Sleep-Wake Schedule:  She is self-described as a night person.  Bedtime: 2250-1112  Wake Time: 1100  Difficulty Falling Asleep: No  Avg Number of Awakenings: 1-2x a night  Cause of Awakenings: racing thoughts, but has been told that she gasps herself awake  Weekend Sleep Schedule: unchanged  Naps: 7 days a week for 30-60 min -- only occasionally feels refreshed by napping    Avg TST per 24 hours: 6 hours (9-10 hours of sleep,  "but only 6 hours of \"good sleep\")    Sleep-Related Details:  Preferred Sleep Position: supine and side(s)  SDB Symptoms: snoring, witnessed apneas, gasping/choking, morning headaches, dry mouth/nose, and waking unrefreshed  Bruxism: she thinks so, but is not sure  Nocturnal GERD: Yes, currently on omeprazole which mostly controls her symptoms  Nocturnal Palpitations: No  Nocturnal Anxiety or Rumination: Yes, racing thoughts at night a times  Sleep-Related Hallucinations: No   Sleep Paralysis: No   Cataplexy: No     She denies having an urge to move the legs in the evening (when resting) nor uncomfortable and/or unpleasant sensations in the legs. She has not been told that she kicks her legs during sleep, but she thinks that she does.  Occasionally she will have aching in her legs after a long shift, but denies an urge to move the legs, not consistently occurring.    She denies any parasomnia activity.    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  Caffeine: Yes, 2 espressos upon waking up, occasionally coffee and energy drinks in the afternoon/evening, latest is 2100  Alcohol Use: Yes, social use  Substance Use: No  Tobacco Use: No  Weight Change: stable recently, she reports she has been working on dietary changed (keto)    She does have difficulty with concentration and occasionally memory.    Past Treatments:  None    Prior Sleep Studies:  None    Other Relevant Labs and Studies:  None    Past Medical History:   Diagnosis Date    Anxiety     Asthma 1982    Bi Lateral Vocal Cord Paralysis with Subglottic Stenosis    Cancer (HCC) 2010    Thyroid maternal grandmother Myriam    Cerebral hemorrhage (HCC)     Depression     Disorder of airway     Fibroadenoma of " right breast     Fibroid 2009    Mother, Maryjane Hysterectomy    GERD (gastroesophageal reflux disease)     Heart disease     Paternal grandfather    History of transfusion 8519-9176    205 blood transfusions as an infant, no issues currently, difficult stick    Hypertension     Father    Inflammation of subglottic region     Kidney stone ’s,     Mother Maryjane Sister Nicole    Polycystic ovary syndrome 2012    Sister Nicole    Pulmonary embolism (HCC)     Mother Maryjane Post Covid    Sinus problem     Skin rash     Sleep apnea     Stroke (HCC) Birth, 2019    Myself at birth resolved, 2019 Father resolved    TIA (transient ischemic attack)     Vocal cord paralysis      Past Surgical History:   Procedure Laterality Date    BREAST BIOPSY Right 05/15/2017    2 sites-U/S BX - both benign     SECTION  , ,     3 siblings    TRACHEOSTOMY      US GUIDANCE BREAST BIOPSY RIGHT EACH ADDITIONAL Right 05/15/2017    US GUIDED BREAST BIOPSY RIGHT COMPLETE Right 05/15/2017     Patient Active Problem List   Diagnosis    Fibroadenoma of right breast    Breast cyst, left    Asthma    Depression with anxiety    Fibrocystic breast    Gastroesophageal reflux disease without esophagitis    Weakness of pelvic floor    Vocal cord paralysis, bilateral complete    Vitamin D deficiency    Laryngeal stenosis    DELFINA (obstructive sleep apnea)    Primary hypertension     Allergies as of 2024 - Reviewed 2024   Allergen Reaction Noted    Cephalosporins Hives and Fever 2009    Clindamycin Hives and Fever 2009    Penicillins Hives and Fever 2009    Tilactase Sneezing 2018    Vitamin e Hives 2014     REVIEW OF SYSTEMS:  Review of Systems  10-point system review completed, all of which are negative except as mentioned above.    CURRENT MEDICATIONS:  Current Outpatient Medications   Medication Instructions    albuterol (Ventolin HFA) 90 mcg/act inhaler 2 puffs, Inhalation, Every 6 hours  PRN    beclomethasone (Qvar RediHaler) 80 MCG/ACT inhaler 2 puffs, Inhalation, 2 times daily    Biotin 1000 MCG tablet     buPROPion (WELLBUTRIN XL) 150 mg, Oral, Every morning    Cholecalciferol (VITAMIN D3) 1,000 Units, Oral, Daily    escitalopram (LEXAPRO) 20 mg, Oral, Daily    loratadine (CLARITIN) 10 mg tablet No dose, route, or frequency recorded.    methylPREDNISolone 4 MG tablet therapy pack Use as directed on package    Multiple Vitamins-Minerals (Womens Multivitamin) TABS No dose, route, or frequency recorded.    omeprazole (PRILOSEC) 40 mg, Oral, Daily    valsartan (DIOVAN) 80 mg, Oral, Daily     SOCIAL HISTORY:  Social History     Tobacco Use    Smoking status: Never    Smokeless tobacco: Never   Vaping Use    Vaping status: Never Used   Substance Use Topics    Alcohol use: Yes     Alcohol/week: 1.0 standard drink of alcohol     Types: 1 Glasses of wine per week    Drug use: Never     FAMILY HISTORY:  Family History   Problem Relation Age of Onset    Thyroid cancer Maternal Grandmother         age dx unk    Cancer Maternal Grandmother         Thyroid    Prostate cancer Paternal Grandfather         age dx unk    Thyroid cancer Paternal Grandfather         age dx unk    Cancer Paternal Grandfather         Thyroid    Heart failure Paternal Grandfather     Brain cancer Cousin         age dx unk    Deep vein thrombosis Mother         Post Covid    Pulmonary embolism Mother         Post Covid    Diabetes Mother     Diabetes Father     Heart disease Father     Stroke Father         2019 resolved    Miscarriages / Stillbirths Sister         1    No Known Problems Maternal Grandfather     No Known Problems Paternal Grandmother     No Known Problems Sister     No Known Problems Brother     No Known Problems Brother     No Known Problems Paternal Aunt     No Known Problems Other     Breast cancer Cousin      Family History of Sleep Disorders: father DELFINA on CPAP    PHYSICAL EXAMINATION:  Vital Signs:  /80    "Pulse 90   Resp 16   Ht 5' 1\" (1.549 m)   Wt 79.8 kg (176 lb)   SpO2 98%   BMI 33.25 kg/m²   Body mass index is 33.25 kg/m².    Constitutional: NAD, well appearing, obese   Mental Status: AAOx3  Neck Circumference: Neck Circumference: 14 inches  Skin: Warm, dry, no rashes noted   Eyes: PERRL, normal conjunctiva  ENT: Nasal congestion absent, nasal valve incompetence absent.  Posterior Airspace:   [place] Tongue Position: 3  Retrognathia: present  Overbite: present  High Arched Palate: absent  Tongue Scalloping/Ridging: absent  Chest: RRR, +S1/S2, audible stridor, no wheezing  Abdomen: Soft, NT/ND  Extremities: No digital clubbing or pedal edema      Rosio Collier MD  Pulmonary-Critical Care and Sleep Medicine  09/09/24    Portions of the record may have been created with voice recognition software. Occasional wrong word or \"sound a like\" substitutions may have occurred due to the inherent limitations of voice recognition software. Please read the chart carefully and recognize, using context, where substitutions have occurred.   "

## 2024-09-09 NOTE — PATIENT INSTRUCTIONS
- An in lab sleep study has been ordered to evaluate for sleep apnea.  This test should be scheduled at your earliest convenience.    - You may have sleep apnea.  Sleep apnea is a serious sleep disorder that occurs when a person's breathing is interrupted during sleep.  People with untreated sleep apnea stop breathing repeatedly during sleep.  - The most common type of sleep apnea is obstructive sleep apnea.  - If left untreated, obstructive sleep apnea can result in a number of health problems including high blood pressure, stroke, irregular heart rhythms, heart failure, diabetes, obesity and heart attacks.  - Treatment options for obstructive sleep apnea may include positive airway pressure (PAP) therapy, oral appliances, hypoglossal nerve stimulator, nose/throat surgery, weight loss, side sleeping or avoidance of medications or substances that can relax the airway muscles (alcohol, benzodiazepines and opioids).  - Avoid driving is drowsy.  It is recommended that if you are dozing while driving that you do not drive until your sleepiness is appropriately treated.  - It is important to lead a healthy lifestyle with adequate sleep (7-9 hours per night), balanced diet and routine exercise.

## 2024-11-01 ENCOUNTER — OFFICE VISIT (OUTPATIENT)
Dept: PULMONOLOGY | Facility: CLINIC | Age: 44
End: 2024-11-01
Payer: COMMERCIAL

## 2024-11-01 VITALS
OXYGEN SATURATION: 98 % | TEMPERATURE: 98 F | WEIGHT: 176 LBS | DIASTOLIC BLOOD PRESSURE: 68 MMHG | SYSTOLIC BLOOD PRESSURE: 128 MMHG | HEIGHT: 61 IN | HEART RATE: 79 BPM | BODY MASS INDEX: 33.23 KG/M2

## 2024-11-01 DIAGNOSIS — G47.33 OSA (OBSTRUCTIVE SLEEP APNEA): ICD-10-CM

## 2024-11-01 DIAGNOSIS — J38.6 LARYNGEAL STENOSIS: ICD-10-CM

## 2024-11-01 DIAGNOSIS — J45.40 MODERATE PERSISTENT ASTHMA WITHOUT COMPLICATION: Primary | ICD-10-CM

## 2024-11-01 DIAGNOSIS — J38.02 VOCAL CORD PARALYSIS, BILATERAL COMPLETE: ICD-10-CM

## 2024-11-01 PROCEDURE — 99214 OFFICE O/P EST MOD 30 MIN: CPT | Performed by: INTERNAL MEDICINE

## 2024-11-01 NOTE — ASSESSMENT & PLAN NOTE
Suspect mld asthma. Her current airway noise is upper airway stridor. Has some bronchospasm with triggers such as cold air.   I don't think PFTs will be helpful given her upper airway stenosis.   - Cont. ICS as needed. Rinse after use.   - up to date on flu shot  - should get pneumococcal vaccine given asthma. I counseled her on this and she would like to hold off.

## 2024-11-01 NOTE — PROGRESS NOTES
Pulmonary Consultation   Maggie Iraheta 43 y.o. female MRN: [id number]  11/1/2024      Referring provider: Dr. Tran  Reason for consult: lung disease    Assessment and Plan:  DELFINA (obstructive sleep apnea)  Known DELFINA, CPAP titration showed pt requires 8 cmH2O CPAP. Did not get machine due to ENT at [facility] recommended she did not use it to avoid pressure on her airway.   - Planned for repeat sleep study in February 2025.    Asthma  Suspect mld asthma. Her current airway noise is upper airway stridor. Has some bronchospasm with triggers such as cold air.   I don't think PFTs will be helpful given her upper airway stenosis.   - Cont. ICS as needed. Rinse after use.   - up to date on flu shot  - should get pneumococcal vaccine given asthma. I counseled her on this and she would like to hold off.     Vocal cord paralysis, bilateral complete  Following with ENT, scheduled to see Dr. Allan in February    Laryngeal stenosis  Requires dilation every few years, had laser previously  See Dr. Allan in February      Diagnoses and all orders for this visit:    Moderate persistent asthma without complication  -     Ambulatory Referral to Pulmonology    Vocal cord paralysis, bilateral complete  -     Ambulatory Referral to Pulmonology    Laryngeal stenosis  -     Ambulatory Referral to Pulmonology    DELFINA (obstructive sleep apnea)    Plan of care discussed with pt. Concerns addressed. Return for follow up in 6 months or sooner if needed.      History of Present Illness   HPI:  Maggie Iraheta is a 43 y.o. female who presents for evaluation of lung disease.    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 Had trach until age 5. Had a rib graft as an infant and lasering of the stenosis. Had another trach at age 21 for airway procedures. Had another trach in 2007 with lasering of stenosis, decannulated at 7 weeks. Followed with ENT at [facility] and then [facility].     Here to evaluate for lung  "disease.     Has a presumed diagnosis asthma based on her stridor.     Has sleep study scheduled in February. Diagnosed with DELFINA in past, CPAP titration recommended for 8 cmH2O.  Does not use it due to previous ENT felt it would be too much pressure on her airway.    + daytime somnolence    Has SOB when she has a URI.   Participated in two 5k's recently - walked and jogged.     Uses Albuterol in the winter when she has SOB which is usually due to cold air.   Has Qvar that she uses as needed.    No regular exercise.  Works as a CNA at a nursing home, very active.     \"I'm a very anxious person.\"    Review of Systems  Positive as above and negative otherwise    Historical Information   Past Medical History:   Diagnosis Date    Anxiety     Asthma     Bi Lateral Vocal Cord Paralysis with Subglottic Stenosis    Cancer (HCC) 2010    Thyroid maternal grandmother Myriam    Cerebral hemorrhage (HCC)     Depression     Disorder of airway     Fibroadenoma of right breast     Fibroid     Mother, Maryjaen Hysterectomy    GERD (gastroesophageal reflux disease)     Heart disease     Paternal grandfather    History of transfusion [id number]    205 blood transfusions as an infant, no issues currently, difficult stick    Hypertension     Father    Inflammation of subglottic region     Kidney stone ’s,     Mother Maryjane Sister Nicole    Polycystic ovary syndrome 2012    Sister Nicole    Pulmonary embolism (HCC)     Mother Maryjane Post Covid    Sinus problem     Skin rash     Sleep apnea     Stroke (HCC) Birth, 2019    Myself at birth resolved, 2019 Father resolved    TIA (transient ischemic attack)     Vocal cord paralysis      Past Surgical History:   Procedure Laterality Date    BREAST BIOPSY Right 05/15/2017    2 sites-U/S BX - both benign     SECTION  , ,     3 siblings    TRACHEOSTOMY      US GUIDANCE BREAST BIOPSY RIGHT EACH ADDITIONAL Right 05/15/2017    US GUIDED BREAST BIOPSY RIGHT COMPLETE Right " 05/15/2017     Family History   Problem Relation Age of Onset    Thyroid cancer Maternal Grandmother         age dx unk    Cancer Maternal Grandmother         Thyroid    Prostate cancer Paternal Grandfather         age dx unk    Thyroid cancer Paternal Grandfather         age dx unk    Cancer Paternal Grandfather         Thyroid    Heart failure Paternal Grandfather     Brain cancer Cousin         age dx unk    Deep vein thrombosis Mother         Post Covid    Pulmonary embolism Mother         Post Covid    Diabetes Mother     Diabetes Father     Heart disease Father     Stroke Father         2019 resolved    Miscarriages / Stillbirths Sister         1    No Known Problems Maternal Grandfather     No Known Problems Paternal Grandmother     No Known Problems Sister     No Known Problems Brother     No Known Problems Brother     No Known Problems Paternal Aunt     No Known Problems Other     Breast cancer Cousin        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    Social History: Never smoker; denies drug use; occasional alcohol use  Pets: 3 cats  Secondhand smoke exposure: siblings smoke marijuana    Meds/Allergies     Current Outpatient Medications:     albuterol (Ventolin HFA) 90 mcg/act inhaler, Inhale 2 puffs every 6 (six) hours as needed for wheezing, Disp: 18 g, Rfl: 5    beclomethasone (Qvar RediHaler) 80 MCG/ACT inhaler, Inhale 2 puffs 2 (two) times a day, Disp: 10.6 g, Rfl: 2    Biotin 1000 MCG tablet, , Disp: , Rfl:     buPROPion (WELLBUTRIN XL) 150 mg 24 hr tablet, Take 1 tablet (150 mg total) by mouth every morning, Disp: 90 tablet, Rfl: 1    Cholecalciferol 25 MCG (1000 UT) tablet, Take 1,000 Units by mouth daily, Disp: , Rfl:     escitalopram (LEXAPRO) 20 mg tablet, Take 1 tablet (20 mg total) by mouth daily, Disp: 90 tablet, Rfl: 1    loratadine (CLARITIN) 10 mg tablet, , Disp: , Rfl:     Multiple Vitamins-Minerals (Womens Multivitamin) TABS, , Disp: , Rfl:     omeprazole (PriLOSEC) 40 MG capsule,  "Take 1 capsule (40 mg total) by mouth daily, Disp: 90 capsule, Rfl: 1    valsartan (DIOVAN) 80 mg tablet, Take 1 tablet (80 mg total) by mouth daily, Disp: 90 tablet, Rfl: 1    methylPREDNISolone 4 MG tablet therapy pack, Use as directed on package (Patient not taking: Reported on 9/5/2024), Disp: 21 each, Rfl: 0  Allergies   Allergen Reactions    Cephalosporins Hives and Fever    Clindamycin Hives and Fever    Penicillins Hives and Fever    Tilactase Sneezing    Vitamin E Hives       Vitals: Blood pressure 128/68, pulse 79, temperature 98 °F (36.7 °C), temperature source Tympanic, height 5' 1\" (1.549 m), weight 79.8 kg (176 lb), SpO2 98%., Body mass index is 33.25 kg/m². Oxygen Therapy  SpO2: 98 %  Oxygen Therapy: None (Room air)    Physical Exam  Gen: WDWN, nad, comfortable  HEENT: NCAT; EOMI; anicteric sclera  Neck: healed trach site  Lungs: + upper airway stridor  Abd: soft    Labs: I have personally reviewed pertinent lab results.  Lab Results   Component Value Date    WBC 8.60 06/03/2024    HGB 14.6 06/03/2024    HCT 45.1 06/03/2024    MCV 93 06/03/2024     06/03/2024     Lab Results   Component Value Date    CALCIUM 9.3 06/03/2024    K 3.9 06/03/2024    CO2 25 06/03/2024     06/03/2024    BUN 12 06/03/2024    CREATININE 0.63 06/03/2024     No results found for: \"IGE\"  Lab Results   Component Value Date    ALT 14 06/03/2024    AST 23 06/03/2024    ALKPHOS 39 06/03/2024     Labs per my interpretation show normal cbc, normal renal function    EKG, Pathology, and Other Studies: 2017 sleep study: +DELFINA, CPAP titration study showed pt required 8 cmH2O    Tosin Rondon D.O.  [facility]   Answers submitted by the patient for this visit:  Pulmonology Questionnaire (Submitted on 10/29/2024)  Chief Complaint: Primary symptoms  Do you have difficulty breathing?: Yes  Do you experience frequent throat clearing?: Yes  Do you have a hoarse voice?: Yes  Chronicity: " recurrent  When did you first notice your symptoms?: more than 1 year ago  How often do your symptoms occur?: every several days  Since you first noticed this problem, how has it changed?: unchanged  Do you have shortness of breath that occurs with effort or exertion?: Yes  Do you have fatigue?: Yes  Do you have shortness of breath when you wake up?: Yes  Which of the following makes your symptoms worse?: change in weather, exposure to smoke, strenuous activity, URI  Which of the following makes your symptoms better?: oral steroids, rest, steroid inhaler

## 2024-11-01 NOTE — LETTER
November 1, 2024     Angela Tran MD  [address]  [address]  [facility] [address]    Patient: Maggie Iraheta   YOB: 1980   Date of Visit: 11/1/2024       Dear Dr. Tran:    Thank you for referring Maggie Iraheta to me for evaluation. Below are my notes for this consultation.    If you have questions, please do not hesitate to call me. I look forward to following your patient along with you.         Sincerely,        Tosin Rondon DO        CC: No Recipients    Tosin Rondon DO  11/1/2024  2:05 PM  Sign when Signing Visit  Pulmonary Consultation   Maggie Iraheta 43 y.o. female MRN: [id number]  11/1/2024      Referring provider: Dr. Tran  Reason for consult: lung disease    Assessment and Plan:  DELFINA (obstructive sleep apnea)  Known DELFINA, CPAP titration showed pt requires 8 cmH2O CPAP. Did not get machine due to ENT at [facility] recommended she did not use it to avoid pressure on her airway.   - Planned for repeat sleep study in February 2025.    Asthma  Suspect mld asthma. Her current airway noise is upper airway stridor. Has some bronchospasm with triggers such as cold air.   I don't think PFTs will be helpful given her upper airway stenosis.   - Cont. ICS as needed. Rinse after use.   - up to date on flu shot  - should get pneumococcal vaccine given asthma. I counseled her on this and she would like to hold off.     Vocal cord paralysis, bilateral complete  Following with ENT, scheduled to see Dr. Allan in February    Laryngeal stenosis  Requires dilation every few years, had laser previously  See Dr. Allan in February      Diagnoses and all orders for this visit:    Moderate persistent asthma without complication  -     Ambulatory Referral to Pulmonology    Vocal cord paralysis, bilateral complete  -     Ambulatory Referral to Pulmonology    Laryngeal stenosis  -     Ambulatory Referral to Pulmonology    DELFINA (obstructive sleep apnea)    Plan of care discussed with pt.  "Concerns addressed. Return for follow up in 6 months or sooner if needed.      History of Present Illness  HPI:  Maggie Iraheta is a 43 y.o. female who presents for evaluation of lung disease.    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 Had trach until age 5. Had a rib graft as an infant and lasering of the stenosis. Had another trach at age 21 for airway procedures. Had another trach in 2007 with lasering of stenosis, decannulated at 7 weeks. Followed with ENT at [facility] [place].     Here to evaluate for lung disease.     Has a presumed diagnosis asthma based on her stridor.     Has sleep study scheduled in February. Diagnosed with DELFINA in past, CPAP titration recommended for 8 cmH2O.  Does not use it due to previous ENT felt it would be too much pressure on her airway.    + daytime somnolence    Has SOB when she has a URI.   Participated in two [employer] recently - walked and jogged.     Uses Albuterol in the winter when she has SOB which is usually due to cold air.   Has Qvar that she uses as needed.    No regular exercise.  Works as a CNA at a nursing home, very active.     \"I'm a very anxious person.\"    Review of Systems  Positive as above and negative otherwise    Historical Information  Past Medical History:   Diagnosis Date   • Anxiety    • Asthma 1982    Bi Lateral Vocal Cord Paralysis with Subglottic Stenosis   • Cancer (HCC) 2010    Thyroid maternal grandmother Myriam   • Cerebral hemorrhage (HCC)    • Depression    • Disorder of airway    • Fibroadenoma of right breast    • Fibroid 2009    Mother, Maryjane Hysterectomy   • GERD (gastroesophageal reflux disease)    • Heart disease     Paternal grandfather   • History of transfusion [id number]    205 blood transfusions as an infant, no issues currently, difficult stick   • Hypertension     Father   • Inflammation of subglottic region    • Kidney stone 1990’s, 2010    Mother Maryjane Sister Nicole   • Polycystic ovary " syndrome 2012    Sister Nicole   • Pulmonary embolism (HCC)     Mother Maryjane Post Covid   • Sinus problem    • Skin rash    • Sleep apnea    • Stroke (HCC) Birth, 2019    Myself at birth resolved, 2019 Father resolved   • TIA (transient ischemic attack)    • Vocal cord paralysis      Past Surgical History:   Procedure Laterality Date   • BREAST BIOPSY Right 05/15/2017    2 sites-U/S BX - both benign   •  SECTION  , ,     3 siblings   • TRACHEOSTOMY     • US GUIDANCE BREAST BIOPSY RIGHT EACH ADDITIONAL Right 05/15/2017   • US GUIDED BREAST BIOPSY RIGHT COMPLETE Right 05/15/2017     Family History   Problem Relation Age of Onset   • Thyroid cancer Maternal Grandmother         age dx unk   • Cancer Maternal Grandmother         Thyroid   • Prostate cancer Paternal Grandfather         age dx unk   • Thyroid cancer Paternal Grandfather         age dx unk   • Cancer Paternal Grandfather         Thyroid   • Heart failure Paternal Grandfather    • Brain cancer Cousin         age dx unk   • Deep vein thrombosis Mother         Post Covid   • Pulmonary embolism Mother         Post Covid   • Diabetes Mother    • Diabetes Father    • Heart disease Father    • Stroke Father         2019 resolved   • Miscarriages / Stillbirths Sister         1   • No Known Problems Maternal Grandfather    • No Known Problems Paternal Grandmother    • No Known Problems Sister    • No Known Problems Brother    • No Known Problems Brother    • No Known Problems Paternal Aunt    • No Known Problems Other    • Breast cancer Cousin        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    Social History: Never smoker; denies drug use; occasional alcohol use  Pets: 3 cats  Secondhand smoke exposure: siblings smoke marijuana    Meds/Allergies    Current Outpatient Medications:   •  albuterol (Ventolin HFA) 90 mcg/act inhaler, Inhale 2 puffs every 6 (six) hours as needed for wheezing, Disp: 18 g, Rfl: 5  •  beclomethasone (Qvar RediHaler)  "80 MCG/ACT inhaler, Inhale 2 puffs 2 (two) times a day, Disp: 10.6 g, Rfl: 2  •  Biotin 1000 MCG tablet, , Disp: , Rfl:   •  buPROPion (WELLBUTRIN XL) 150 mg 24 hr tablet, Take 1 tablet (150 mg total) by mouth every morning, Disp: 90 tablet, Rfl: 1  •  Cholecalciferol 25 MCG (1000 UT) tablet, Take 1,000 Units by mouth daily, Disp: , Rfl:   •  escitalopram (LEXAPRO) 20 mg tablet, Take 1 tablet (20 mg total) by mouth daily, Disp: 90 tablet, Rfl: 1  •  loratadine (CLARITIN) 10 mg tablet, , Disp: , Rfl:   •  Multiple Vitamins-Minerals (Womens Multivitamin) TABS, , Disp: , Rfl:   •  omeprazole (PriLOSEC) 40 MG capsule, Take 1 capsule (40 mg total) by mouth daily, Disp: 90 capsule, Rfl: 1  •  valsartan (DIOVAN) 80 mg tablet, Take 1 tablet (80 mg total) by mouth daily, Disp: 90 tablet, Rfl: 1  •  methylPREDNISolone 4 MG tablet therapy pack, Use as directed on package (Patient not taking: Reported on 9/5/2024), Disp: 21 each, Rfl: 0  Allergies   Allergen Reactions   • Cephalosporins Hives and Fever   • Clindamycin Hives and Fever   • Penicillins Hives and Fever   • Tilactase Sneezing   • Vitamin E Hives       Vitals: Blood pressure 128/68, pulse 79, temperature 98 °F (36.7 °C), temperature source Tympanic, height 5' 1\" (1.549 m), weight 79.8 kg (176 lb), SpO2 98%., Body mass index is 33.25 kg/m². Oxygen Therapy  SpO2: 98 %  Oxygen Therapy: None (Room air)    Physical Exam  Gen: WDWN, nad, comfortable  HEENT: NCAT; EOMI; anicteric sclera  Neck: healed trach site  Lungs: + upper airway stridor  Abd: soft    Labs: I have personally reviewed pertinent lab results.  Lab Results   Component Value Date    WBC 8.60 06/03/2024    HGB 14.6 06/03/2024    HCT 45.1 06/03/2024    MCV 93 06/03/2024     06/03/2024     Lab Results   Component Value Date    CALCIUM 9.3 06/03/2024    K 3.9 06/03/2024    CO2 25 06/03/2024     06/03/2024    BUN 12 06/03/2024    CREATININE 0.63 06/03/2024     No results found for: \"IGE\"  Lab Results "   Component Value Date    ALT 14 06/03/2024    AST 23 06/03/2024    ALKPHOS 39 06/03/2024     Labs per my interpretation show normal cbc, normal renal function    EKG, Pathology, and Other Studies: 2017 sleep study: +DELFINA, CPAP titration study showed pt required 8 cmH2O    Tosin Rondon D.O.  [facility]   Answers submitted by the patient for this visit:  Pulmonology Questionnaire (Submitted on 10/29/2024)  Chief Complaint: Primary symptoms  Do you have difficulty breathing?: Yes  Do you experience frequent throat clearing?: Yes  Do you have a hoarse voice?: Yes  Chronicity: recurrent  When did you first notice your symptoms?: more than 1 year ago  How often do your symptoms occur?: every several days  Since you first noticed this problem, how has it changed?: unchanged  Do you have shortness of breath that occurs with effort or exertion?: Yes  Do you have fatigue?: Yes  Do you have shortness of breath when you wake up?: Yes  Which of the following makes your symptoms worse?: change in weather, exposure to smoke, strenuous activity, URI  Which of the following makes your symptoms better?: oral steroids, rest, steroid inhaler

## 2024-11-01 NOTE — ASSESSMENT & PLAN NOTE
Known DELFINA, CPAP titration showed pt requires 8 cmH2O CPAP. Did not get machine due to ENT at \[facility] [place] [facility]\"" recommended she did not use it to avoid pressure on her airway.   - Planned for repeat sleep study in February 2025.

## 2024-12-01 DIAGNOSIS — I10 PRIMARY HYPERTENSION: ICD-10-CM

## 2024-12-01 DIAGNOSIS — K21.9 GASTROESOPHAGEAL REFLUX DISEASE WITHOUT ESOPHAGITIS: ICD-10-CM

## 2024-12-01 DIAGNOSIS — F41.8 DEPRESSION WITH ANXIETY: ICD-10-CM

## 2024-12-03 RX ORDER — ESCITALOPRAM OXALATE 20 MG/1
20 TABLET ORAL DAILY
Qty: 90 TABLET | Refills: 1 | Status: SHIPPED | OUTPATIENT
Start: 2024-12-03

## 2024-12-03 RX ORDER — OMEPRAZOLE 40 MG/1
40 CAPSULE, DELAYED RELEASE ORAL DAILY
Qty: 90 CAPSULE | Refills: 1 | Status: SHIPPED | OUTPATIENT
Start: 2024-12-03

## 2024-12-03 RX ORDER — VALSARTAN 80 MG/1
80 TABLET ORAL DAILY
Qty: 90 TABLET | Refills: 1 | Status: SHIPPED | OUTPATIENT
Start: 2024-12-03

## 2024-12-03 RX ORDER — BUPROPION HYDROCHLORIDE 150 MG/1
150 TABLET ORAL EVERY MORNING
Qty: 90 TABLET | Refills: 1 | Status: SHIPPED | OUTPATIENT
Start: 2024-12-03

## 2024-12-12 ENCOUNTER — OFFICE VISIT (OUTPATIENT)
Dept: FAMILY MEDICINE CLINIC | Facility: CLINIC | Age: 44
End: 2024-12-12
Payer: COMMERCIAL

## 2024-12-12 VITALS
BODY MASS INDEX: 31.15 KG/M2 | HEART RATE: 105 BPM | OXYGEN SATURATION: 96 % | SYSTOLIC BLOOD PRESSURE: 110 MMHG | HEIGHT: 61 IN | DIASTOLIC BLOOD PRESSURE: 82 MMHG | WEIGHT: 165 LBS

## 2024-12-12 DIAGNOSIS — F32.2 SEVERE MAJOR DEPRESSIVE DISORDER (HCC): ICD-10-CM

## 2024-12-12 DIAGNOSIS — Z00.00 ANNUAL PHYSICAL EXAM: Primary | ICD-10-CM

## 2024-12-12 PROCEDURE — 99396 PREV VISIT EST AGE 40-64: CPT | Performed by: FAMILY MEDICINE
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
## 2024-12-12 NOTE — ASSESSMENT & PLAN NOTE
Depression Screening Follow-up Plan: Patient's depression screening was positive with a PHQ-9 score of 10. Continue regular follow-up with their psychologist/therapist/psychiatrist who is managing their mental health condition(s).

## 2024-12-31 ENCOUNTER — HOSPITAL ENCOUNTER (OUTPATIENT)
Dept: RADIOLOGY | Facility: CLINIC | Age: 44
Discharge: HOME | End: 2024-12-31
Payer: COMMERCIAL

## 2024-12-31 VITALS — HEIGHT: 62 IN | WEIGHT: 165 LBS | BODY MASS INDEX: 30.36 KG/M2

## 2024-12-31 DIAGNOSIS — Z12.31 BREAST CANCER SCREENING BY MAMMOGRAM: ICD-10-CM

## 2024-12-31 PROCEDURE — 77063 BREAST TOMOSYNTHESIS BI: CPT

## 2024-12-31 PROCEDURE — 77067 SCR MAMMO BI INCL CAD: CPT

## 2025-01-03 ENCOUNTER — RESULTS FOLLOW-UP (OUTPATIENT)
Dept: OBGYN CLINIC | Facility: MEDICAL CENTER | Age: 45
End: 2025-01-03

## 2025-02-07 ENCOUNTER — HOSPITAL ENCOUNTER (OUTPATIENT)
Dept: SLEEP CENTER | Facility: CLINIC | Age: 45
Discharge: HOME | End: 2025-02-07
Payer: COMMERCIAL

## 2025-02-07 DIAGNOSIS — R06.83 SNORING: ICD-10-CM

## 2025-02-07 DIAGNOSIS — G47.19 EXCESSIVE DAYTIME SLEEPINESS: ICD-10-CM

## 2025-02-07 DIAGNOSIS — R06.81 WITNESSED EPISODE OF APNEA: ICD-10-CM

## 2025-02-07 DIAGNOSIS — G47.33 OSA (OBSTRUCTIVE SLEEP APNEA): ICD-10-CM

## 2025-02-07 PROCEDURE — 95810 POLYSOM 6/> YRS 4/> PARAM: CPT

## 2025-02-08 NOTE — PROGRESS NOTES
Sleep Study Documentation    Pre-Sleep Study       Sleep testing procedure explained to patient:YES    Patient napped prior to study:YES- less than 30 minutes. Napped after 2PM: no    Caffeine:Dayshift worker after 12PM.  Caffeine use:YES- coffee  6 ounces and chocolate  6 ounces    Alcohol:Dayshift workers after 5PM: Alcohol use:YES-Wine 1 serving    Typical day for patient:NO       Study Documentation    Sleep Study Indications: Witnessed apneas, loud snoring    Sleep Study: Diagnostic   Snore:Moderate  Supplemental O2: no      Minimum SaO2 83%  Baseline SaO2 95%    EKG abnormalities: no     EEG abnormalities: no    Were abnormal behaviors in sleep observed:NO    Is Total Sleep Study Recording Time < 2 hours: N/A    Is Total Sleep Study Recording Time > 2 hours but study is incomplete: N/A    Is Total Sleep Study Recording Time 6 hours or more but sleep was not obtained: NO    Patient classification: employed       Post-Sleep Study    Medication used at bedtime or during sleep study:NO    Patient reports time it took to fall asleep:30 to 60 minutes    Patient reports waking up during study:1 to 2 times.  Patient reports returning to sleep in 10 to 30 minutes.    Patient reports sleeping 4 to 6 hours with dreaming.    Does the Patient feel this is a typical night of sleep:better than usual    Patient rated sleepiness: Somewhat sleepy or tired    PAP treatment:no.

## 2025-02-21 ENCOUNTER — RESULTS FOLLOW-UP (OUTPATIENT)
Dept: PULMONOLOGY | Facility: CLINIC | Age: 45
End: 2025-02-21

## 2025-02-21 PROBLEM — G47.19 EXCESSIVE DAYTIME SLEEPINESS: Status: ACTIVE | Noted: 2025-02-21

## 2025-02-21 PROBLEM — R06.81 WITNESSED EPISODE OF APNEA: Status: ACTIVE | Noted: 2025-02-21

## 2025-02-21 PROBLEM — R06.83 SNORING: Status: ACTIVE | Noted: 2025-02-21

## 2025-02-25 NOTE — TELEPHONE ENCOUNTER
Per chart, patient viewed results via [employer].     Called patient and left message advising I will send a [employer] message with the sleep study results and next steps.  Provided sleep center number([phone]) to call if any questions.

## 2025-03-18 DIAGNOSIS — Z00.6 ENCOUNTER FOR EXAMINATION FOR NORMAL COMPARISON OR CONTROL IN CLINICAL RESEARCH PROGRAM: ICD-10-CM

## 2025-03-24 ENCOUNTER — APPOINTMENT (OUTPATIENT)
Dept: LAB | Facility: CLINIC | Age: 45
End: 2025-03-24

## 2025-03-24 DIAGNOSIS — Z00.6 ENCOUNTER FOR EXAMINATION FOR NORMAL COMPARISON OR CONTROL IN CLINICAL RESEARCH PROGRAM: ICD-10-CM

## 2025-03-24 PROCEDURE — 36415 COLL VENOUS BLD VENIPUNCTURE: CPT

## 2025-04-08 LAB
APOB+LDLR+PCSK9 GENE MUT ANL BLD/T: NOT DETECTED
BRCA1+BRCA2 DEL+DUP + FULL MUT ANL BLD/T: NOT DETECTED
MLH1+MSH2+MSH6+PMS2 GN DEL+DUP+FUL M: NOT DETECTED